# Patient Record
Sex: FEMALE | Race: WHITE | URBAN - METROPOLITAN AREA
[De-identification: names, ages, dates, MRNs, and addresses within clinical notes are randomized per-mention and may not be internally consistent; named-entity substitution may affect disease eponyms.]

---

## 2017-02-08 ENCOUNTER — OFFICE VISIT (OUTPATIENT)
Dept: FAMILY MEDICINE CLINIC | Facility: CLINIC | Age: 82
End: 2017-02-08

## 2017-02-08 ENCOUNTER — APPOINTMENT (OUTPATIENT)
Dept: LAB | Age: 82
End: 2017-02-08
Attending: FAMILY MEDICINE
Payer: MEDICARE

## 2017-02-08 ENCOUNTER — TELEPHONE (OUTPATIENT)
Dept: FAMILY MEDICINE CLINIC | Facility: CLINIC | Age: 82
End: 2017-02-08

## 2017-02-08 VITALS
SYSTOLIC BLOOD PRESSURE: 158 MMHG | HEART RATE: 68 BPM | DIASTOLIC BLOOD PRESSURE: 78 MMHG | BODY MASS INDEX: 35.96 KG/M2 | WEIGHT: 169 LBS | OXYGEN SATURATION: 96 % | TEMPERATURE: 96 F | RESPIRATION RATE: 36 BRPM | HEIGHT: 57.5 IN

## 2017-02-08 DIAGNOSIS — J44.1 OBSTRUCTIVE CHRONIC BRONCHITIS WITH EXACERBATION (HCC): ICD-10-CM

## 2017-02-08 DIAGNOSIS — E03.9 HYPOTHYROIDISM, UNSPECIFIED TYPE: ICD-10-CM

## 2017-02-08 DIAGNOSIS — I10 ESSENTIAL HYPERTENSION: ICD-10-CM

## 2017-02-08 DIAGNOSIS — I10 ESSENTIAL HYPERTENSION: Primary | ICD-10-CM

## 2017-02-08 DIAGNOSIS — M15.9 PRIMARY OSTEOARTHRITIS INVOLVING MULTIPLE JOINTS: ICD-10-CM

## 2017-02-08 DIAGNOSIS — J84.112 IDIOPATHIC FIBROSING ALVEOLITIS (HCC): ICD-10-CM

## 2017-02-08 LAB — TSI SER-ACNC: 3.49 MIU/ML (ref 0.35–5.5)

## 2017-02-08 PROCEDURE — 99214 OFFICE O/P EST MOD 30 MIN: CPT | Performed by: FAMILY MEDICINE

## 2017-02-08 PROCEDURE — 84443 ASSAY THYROID STIM HORMONE: CPT

## 2017-02-08 NOTE — PROGRESS NOTES
[address]  PROGRESS NOTE  Chief Complaint:   Patient presents with: Follow - Up: medical check      HPI:   This is a 80year old female coming in for HTN f/u. soes not check home BP    Breathing- stable- on oxygen 3 Liter.  Sees Dr. Adrienne Ramirez Cap  Disp:  Rfl:    celecoxib (CELEBREX) 200 MG Oral Cap Take 200 mg by mouth daily.    Disp:  Rfl:    clotrimazole-betamethasone (LOTRISONE) 1-0.05 % External Cream  Disp:  Rfl:    Fesoterodine Fumarate ER (TOVIAZ) 4 MG Oral Tablet 24 Hr Take 4 mg by mouth increased after lab work that she had done approximately 3-4 months ago. Patient due for follow-up TSH today  ALLERGIES:  Denies allergic response, history of asthma, sneezing, hives, eczema or rhinitis. EXAM:   /78 mmHg  Pulse 68  Temp(Src) 96. cuff      Meds & Refills for this Visit:  No prescriptions requested or ordered in this encounter    Health Maintenance:  Annual Depression Screen due on 05/16/1929  Annual Physical due on 05/16/1931  Mammogram,1 Yr due on 05/16/1969  FIT Colorectal Screen

## 2017-02-08 NOTE — PATIENT INSTRUCTIONS
rec continue meds  Continue use of oxygen  rec recheck tsh- dose of levothyroxine changed on November    Consider getting home BP cuff

## 2017-02-09 NOTE — TELEPHONE ENCOUNTER
----- Message from Rupa Rooney MD sent at 2/8/2017  6:26 PM CST -----  Normal tsh- continue same dose of synthroid.  Recheck 6-12 months with routine labs

## 2017-04-17 ENCOUNTER — OFFICE VISIT (OUTPATIENT)
Dept: FAMILY MEDICINE CLINIC | Facility: CLINIC | Age: 82
End: 2017-04-17

## 2017-04-17 VITALS
RESPIRATION RATE: 20 BRPM | BODY MASS INDEX: 36.43 KG/M2 | SYSTOLIC BLOOD PRESSURE: 142 MMHG | DIASTOLIC BLOOD PRESSURE: 84 MMHG | TEMPERATURE: 97 F | WEIGHT: 171.19 LBS | HEIGHT: 57.5 IN | HEART RATE: 64 BPM

## 2017-04-17 DIAGNOSIS — S89.92XA TRAUMATIC LEG INJURY, LEFT, INITIAL ENCOUNTER: Primary | ICD-10-CM

## 2017-04-17 PROCEDURE — 99213 OFFICE O/P EST LOW 20 MIN: CPT | Performed by: NURSE PRACTITIONER

## 2017-04-17 RX ORDER — LEVOTHYROXINE SODIUM 0.03 MG/1
25 TABLET ORAL
COMMUNITY
End: 2018-05-30 | Stop reason: ALTCHOICE

## 2017-04-18 NOTE — PATIENT INSTRUCTIONS
Monitor area. Massage area several times a day. Ice for today and then heat to the area. Return to clinic if symptoms worsen or not improving.

## 2017-04-18 NOTE — PROGRESS NOTES
HPI:    Patient ID: Nettie Galarza is a 80year old female. HPI     Taye Grimaldo in bathroom early Saturday morning. States she just tripped and fell. Denies any pain except to the right lower leg with pressure. No pain with walking.  Notes a small bruise below Right lower leg: She exhibits tenderness. Legs:  Ambulates with walker. +1 - 2 edema to the lower extremities bilaterally   Neurological: She is alert and oriented to person, place, and time.    Skin:        Psychiatric: Thought content normal.

## 2017-06-12 ENCOUNTER — OFFICE VISIT (OUTPATIENT)
Dept: FAMILY MEDICINE CLINIC | Facility: CLINIC | Age: 82
End: 2017-06-12

## 2017-06-12 VITALS
BODY MASS INDEX: 36 KG/M2 | TEMPERATURE: 98 F | RESPIRATION RATE: 18 BRPM | HEART RATE: 64 BPM | WEIGHT: 171.5 LBS | SYSTOLIC BLOOD PRESSURE: 142 MMHG | OXYGEN SATURATION: 92 % | DIASTOLIC BLOOD PRESSURE: 68 MMHG

## 2017-06-12 DIAGNOSIS — J84.112 IDIOPATHIC FIBROSING ALVEOLITIS (HCC): ICD-10-CM

## 2017-06-12 DIAGNOSIS — N18.30 STAGE 3 CHRONIC KIDNEY DISEASE (HCC): ICD-10-CM

## 2017-06-12 DIAGNOSIS — M54.9 OTHER CHRONIC BACK PAIN: ICD-10-CM

## 2017-06-12 DIAGNOSIS — I10 ESSENTIAL HYPERTENSION: Primary | ICD-10-CM

## 2017-06-12 DIAGNOSIS — J44.1 OBSTRUCTIVE CHRONIC BRONCHITIS WITH EXACERBATION (HCC): ICD-10-CM

## 2017-06-12 DIAGNOSIS — N39.3 FEMALE STRESS INCONTINENCE: ICD-10-CM

## 2017-06-12 DIAGNOSIS — E03.9 HYPOTHYROIDISM, UNSPECIFIED TYPE: ICD-10-CM

## 2017-06-12 DIAGNOSIS — G89.29 OTHER CHRONIC BACK PAIN: ICD-10-CM

## 2017-06-12 DIAGNOSIS — E78.49 FAMILIAL MULTIPLE LIPOPROTEIN-TYPE HYPERLIPIDEMIA: ICD-10-CM

## 2017-06-12 PROCEDURE — 99214 OFFICE O/P EST MOD 30 MIN: CPT | Performed by: FAMILY MEDICINE

## 2017-06-12 NOTE — PROGRESS NOTES
[address]  PROGRESS NOTE  Chief Complaint:   Patient presents with: Follow - Up      HPI:   This is a 80year old female coming in for   Pt at home with . On oxygen and using walker.   Pt with  Mild fall in April- none since  [facility] Comment:Other reaction(s): hives  Atorvastatin            Myalgia  Clarithromycin              Comment:Other reaction(s): weakness, numbness/tinglilng/rm  Doxycycline                 Comment:Other reaction(s): yeast infection  Current Me stool.  MUSCULOSKELETAL: see above  NEUROLOGICAL:  Denies headache, seizures, dizziness, syncope, paralysis, ataxia, numbness or tingling in the extremities  HEMATOLOGIC:  Denies anemia, bleeding or bruising.   LYMPHATICS:  Denies enlarged nodes  PSYCHIATRI hyperlipidemia  - Lipid Panel [E]; Future    3. Idiopathic fibrosing alveolitis ([facility] [address]  Stable patient follows up with Dr. Dorothy Hernandez intermittently    4. Obstructive chronic bronchitis with exacerbation ([facility] [address]  As above  5.  Hypothyroidism, unspecified type  Labs Osteoporosis     Chronic kidney disease     Idiopathic fibrosing alveolitis (HCC)     Transient cerebral ischemia     Benign paroxysmal positional vertigo     Back pain      Josue Richardson MD  6/12/2017  10:07 AM

## 2017-06-12 NOTE — PATIENT INSTRUCTIONS
Fasting labs - chem ,lipid, tsh, cbc    Cardiology- f.u Dr. Ramírez Spearing other meds    Medicare physical at next visit- 3-4 months pending review of labs

## 2017-06-22 ENCOUNTER — LAB ENCOUNTER (OUTPATIENT)
Dept: LAB | Age: 82
End: 2017-06-22
Attending: FAMILY MEDICINE
Payer: MEDICARE

## 2017-06-22 DIAGNOSIS — E78.49 FAMILIAL MULTIPLE LIPOPROTEIN-TYPE HYPERLIPIDEMIA: ICD-10-CM

## 2017-06-22 DIAGNOSIS — I10 ESSENTIAL HYPERTENSION: ICD-10-CM

## 2017-06-22 DIAGNOSIS — N18.30 STAGE 3 CHRONIC KIDNEY DISEASE (HCC): ICD-10-CM

## 2017-06-22 DIAGNOSIS — E03.9 HYPOTHYROIDISM, UNSPECIFIED TYPE: ICD-10-CM

## 2017-06-22 PROCEDURE — 85025 COMPLETE CBC W/AUTO DIFF WBC: CPT

## 2017-06-22 PROCEDURE — 84443 ASSAY THYROID STIM HORMONE: CPT

## 2017-06-22 PROCEDURE — 80053 COMPREHEN METABOLIC PANEL: CPT

## 2017-06-22 PROCEDURE — 36415 COLL VENOUS BLD VENIPUNCTURE: CPT

## 2017-06-22 PROCEDURE — 80061 LIPID PANEL: CPT

## 2017-06-23 ENCOUNTER — TELEPHONE (OUTPATIENT)
Dept: FAMILY MEDICINE CLINIC | Facility: CLINIC | Age: 82
End: 2017-06-23

## 2017-06-23 DIAGNOSIS — N28.9 RENAL INSUFFICIENCY: Primary | ICD-10-CM

## 2017-06-23 RX ORDER — MELOXICAM 15 MG/1
15 TABLET ORAL DAILY
Qty: 90 TABLET | Refills: 0 | Status: SHIPPED | OUTPATIENT
Start: 2017-06-23 | End: 2017-10-13

## 2017-06-23 NOTE — TELEPHONE ENCOUNTER
Patient states the price of her celebrex (generic) has increased $80 a month  Called pharm to verify that this is the price for generic  States she can no longer afford medication  Wants to know if there is something else Dr. Candido Bear can give her that wi

## 2017-06-23 NOTE — TELEPHONE ENCOUNTER
Patient notified of medication change and expressed understanding  States Will call with update on how she is doing on med    Yani Shen, 06/23/2017, 2:52 PM

## 2017-07-28 RX ORDER — CLOTRIMAZOLE AND BETAMETHASONE DIPROPIONATE 10; .64 MG/G; MG/G
1 CREAM TOPICAL AS NEEDED
Qty: 45 G | Refills: 0 | Status: SHIPPED | OUTPATIENT
Start: 2017-07-28 | End: 2020-02-10

## 2017-10-13 ENCOUNTER — OFFICE VISIT (OUTPATIENT)
Dept: FAMILY MEDICINE CLINIC | Facility: CLINIC | Age: 82
End: 2017-10-13

## 2017-10-13 VITALS
HEART RATE: 60 BPM | TEMPERATURE: 98 F | DIASTOLIC BLOOD PRESSURE: 82 MMHG | BODY MASS INDEX: 37.03 KG/M2 | RESPIRATION RATE: 18 BRPM | SYSTOLIC BLOOD PRESSURE: 158 MMHG | WEIGHT: 174 LBS | HEIGHT: 57.5 IN | OXYGEN SATURATION: 92 %

## 2017-10-13 DIAGNOSIS — I10 ESSENTIAL HYPERTENSION: ICD-10-CM

## 2017-10-13 DIAGNOSIS — N18.30 STAGE 3 CHRONIC KIDNEY DISEASE (HCC): ICD-10-CM

## 2017-10-13 DIAGNOSIS — R26.9 ABNORMALITY OF GAIT: ICD-10-CM

## 2017-10-13 DIAGNOSIS — E78.49 FAMILIAL MULTIPLE LIPOPROTEIN-TYPE HYPERLIPIDEMIA: ICD-10-CM

## 2017-10-13 DIAGNOSIS — M15.9 PRIMARY OSTEOARTHRITIS INVOLVING MULTIPLE JOINTS: ICD-10-CM

## 2017-10-13 DIAGNOSIS — M81.0 AGE-RELATED OSTEOPOROSIS WITHOUT CURRENT PATHOLOGICAL FRACTURE: ICD-10-CM

## 2017-10-13 DIAGNOSIS — Z13.31 DEPRESSION SCREENING: ICD-10-CM

## 2017-10-13 DIAGNOSIS — N39.3 FEMALE STRESS INCONTINENCE: ICD-10-CM

## 2017-10-13 DIAGNOSIS — Z00.00 ENCOUNTER FOR ANNUAL HEALTH EXAMINATION: Primary | ICD-10-CM

## 2017-10-13 DIAGNOSIS — J44.1 OBSTRUCTIVE CHRONIC BRONCHITIS WITH EXACERBATION (HCC): ICD-10-CM

## 2017-10-13 DIAGNOSIS — E03.9 HYPOTHYROIDISM, UNSPECIFIED TYPE: ICD-10-CM

## 2017-10-13 PROCEDURE — 90736 HZV VACCINE LIVE SUBQ: CPT | Performed by: FAMILY MEDICINE

## 2017-10-13 PROCEDURE — G0439 PPPS, SUBSEQ VISIT: HCPCS | Performed by: FAMILY MEDICINE

## 2017-10-13 PROCEDURE — 90472 IMMUNIZATION ADMIN EACH ADD: CPT | Performed by: FAMILY MEDICINE

## 2017-10-13 PROCEDURE — G0444 DEPRESSION SCREEN ANNUAL: HCPCS | Performed by: FAMILY MEDICINE

## 2017-10-13 PROCEDURE — G0008 ADMIN INFLUENZA VIRUS VAC: HCPCS | Performed by: FAMILY MEDICINE

## 2017-10-13 PROCEDURE — 90653 IIV ADJUVANT VACCINE IM: CPT | Performed by: FAMILY MEDICINE

## 2017-10-13 RX ORDER — MELOXICAM 15 MG/1
15 TABLET ORAL DAILY
Qty: 90 TABLET | Refills: 1 | Status: SHIPPED | OUTPATIENT
Start: 2017-10-13 | End: 2018-12-20

## 2017-10-13 NOTE — PATIENT INSTRUCTIONS
rec fasting labs    rec Sr.  Flu vaccine    rec Shingle vaccine    Continue meds    Continue oixygen    Pt to return after has labs  To review results and remove lesion on forehead    Chapis Benson's SCREENING SCHEDULE   Tests on this list are recommended than 100 cigarettes in their lifetime   • Anyone with a family history    Colorectal Cancer Screening  Covered up to Age 76     Colonoscopy Screen   Covered every 10 years- more often if abnormal There are no preventive care reminders to display for this p or any previous visit. Please get every year    Pneumococcal 13 (Prevnar)  Covered Once after 65 No orders found for this or any previous visit.  Please get once after your 65th birthday    Pneumococcal 23 (Pneumovax)  Covered Once after 65 No orders found

## 2017-10-13 NOTE — PROGRESS NOTES
CC: Annual Physical Exam    HPI:   Priya King is a 80year old female who presents for a complete physical exam.     Pt with help of 80 yr     Pt generally feeling ok. On chronic oxygen  Use of walker.  Last fall- tripped 3-4 months ago- no injur Eosinophil Absolute 0.35 (H) 0.00 - 0.30 x10(3) uL   Basophil Absolute 0.06 0.00 - 0.10 x10(3) uL   Immature Granulocyte Absolute 0.03 0.00 - 1.00 x10(3) uL   Neutrophil % 55.4 %   Lymphocyte % 29.0 %   Monocyte % 9.8 %   Eosinophil % 4.6 %   Basophil % Social History:   Social History    Marital status:              Spouse name:                       Years of education:                 Number of children:               Social History Main Topics    Smoking status: Never Smoker 37.00 kg/m²  Estimated body mass index is 37 kg/m² as calculated from the following:    Height as of this encounter: 57.5\". Weight as of this encounter: 174 lb. Vital signs reviewed. Appears stated age, well groomed.   Physical Exam:  GEN:  Patient is Future    3. Essential hypertension    - CBC WITH DIFFERENTIAL WITH PLATELET; Future  - COMP METABOLIC PANEL (14); Future  - LIPID PANEL; Future  - URINALYSIS WITH CULTURE REFLEX; Future  - TSH W REFLEX TO FREE T4; Future    4.  Obstructive chronic bronchit DIFFERENTIAL WITH PLATELET; Future  - COMP METABOLIC PANEL (14); Future  - LIPID PANEL; Future  - URINALYSIS WITH CULTURE REFLEX; Future  - TSH W REFLEX TO FREE T4; Future      Order for mammogram and dexascan  Self Breast exam reviewed.   [facility] impaired FBS or GTT   … or any two of the following:   • Overweight (BMI ³25 but <30)   • Family history of diabetes   • Age 72 years or older   • History of gestational diabetes or birth of baby weighing more than 9 pounds     Covered at least every 3 yea to schedule if you are in this risk group, make sure you have a referral   Bone Density Screening      Bone density screening   Covered every 2 yrs after age 72    Covered yearly for Long term Glucocorticoid medication (Steroids) Requires diagnosis related this or any previous visit. This may be covered with your prescription benefits, but Medicare does not cover unless Medically needed    Zoster (Not covered by Medicare Part B) No orders found for this or any previous visit.  This may be covered with your ph

## 2017-10-23 ENCOUNTER — TELEPHONE (OUTPATIENT)
Dept: FAMILY MEDICINE CLINIC | Facility: CLINIC | Age: 82
End: 2017-10-23

## 2017-10-23 ENCOUNTER — LABORATORY ENCOUNTER (OUTPATIENT)
Dept: LAB | Age: 82
End: 2017-10-23
Attending: FAMILY MEDICINE
Payer: MEDICARE

## 2017-10-23 DIAGNOSIS — N39.3 FEMALE STRESS INCONTINENCE: ICD-10-CM

## 2017-10-23 DIAGNOSIS — R26.9 ABNORMALITY OF GAIT: ICD-10-CM

## 2017-10-23 DIAGNOSIS — J44.1 OBSTRUCTIVE CHRONIC BRONCHITIS WITH EXACERBATION (HCC): ICD-10-CM

## 2017-10-23 DIAGNOSIS — E78.49 FAMILIAL MULTIPLE LIPOPROTEIN-TYPE HYPERLIPIDEMIA: ICD-10-CM

## 2017-10-23 DIAGNOSIS — N18.30 STAGE 3 CHRONIC KIDNEY DISEASE (HCC): ICD-10-CM

## 2017-10-23 DIAGNOSIS — Z00.00 ENCOUNTER FOR ANNUAL HEALTH EXAMINATION: ICD-10-CM

## 2017-10-23 DIAGNOSIS — I10 ESSENTIAL HYPERTENSION: ICD-10-CM

## 2017-10-23 DIAGNOSIS — Z13.31 DEPRESSION SCREENING: ICD-10-CM

## 2017-10-23 DIAGNOSIS — E03.9 HYPOTHYROIDISM, UNSPECIFIED TYPE: ICD-10-CM

## 2017-10-23 DIAGNOSIS — M81.0 AGE-RELATED OSTEOPOROSIS WITHOUT CURRENT PATHOLOGICAL FRACTURE: ICD-10-CM

## 2017-10-23 DIAGNOSIS — M15.9 PRIMARY OSTEOARTHRITIS INVOLVING MULTIPLE JOINTS: ICD-10-CM

## 2017-10-23 DIAGNOSIS — N28.9 RENAL INSUFFICIENCY: ICD-10-CM

## 2017-10-23 PROCEDURE — 36415 COLL VENOUS BLD VENIPUNCTURE: CPT

## 2017-10-23 PROCEDURE — 80061 LIPID PANEL: CPT

## 2017-10-23 PROCEDURE — 80053 COMPREHEN METABOLIC PANEL: CPT

## 2017-10-23 PROCEDURE — 85025 COMPLETE CBC W/AUTO DIFF WBC: CPT

## 2017-10-23 PROCEDURE — 82306 VITAMIN D 25 HYDROXY: CPT

## 2017-10-23 PROCEDURE — 84443 ASSAY THYROID STIM HORMONE: CPT

## 2017-10-23 NOTE — TELEPHONE ENCOUNTER
Patient states growth on forehead dried up and fell off. Do you still want patient to followup? Please advise

## 2017-10-31 ENCOUNTER — TELEPHONE (OUTPATIENT)
Dept: FAMILY MEDICINE CLINIC | Facility: CLINIC | Age: 82
End: 2017-10-31

## 2017-10-31 DIAGNOSIS — E55.9 VITAMIN D DEFICIENCY: Primary | ICD-10-CM

## 2017-10-31 RX ORDER — ERGOCALCIFEROL 1.25 MG/1
50000 CAPSULE ORAL WEEKLY
Qty: 12 CAPSULE | Refills: 0 | Status: SHIPPED | OUTPATIENT
Start: 2017-10-31 | End: 2018-01-17

## 2017-10-31 NOTE — TELEPHONE ENCOUNTER
----- Message from Roland Sharma MD sent at 10/31/2017  9:45 AM CDT -----  Laboratory results reviewed. Patient's renal function slightly decreased but stable. Patient's chemistry panel is otherwise normal her lipids are in the desired range.   Samuel

## 2017-11-20 RX ORDER — LEVOTHYROXINE SODIUM 0.07 MG/1
TABLET ORAL
Qty: 90 TABLET | Refills: 2 | Status: SHIPPED | OUTPATIENT
Start: 2017-11-20 | End: 2019-02-20

## 2017-11-20 NOTE — TELEPHONE ENCOUNTER
Future appt: Patient does not have an upcoming appt scheduled.  Patient was instructed to return after labs    Last Appointment:  10/13/2017    Medication last refilled:  11/7/16    Cholesterol, Total (mg/dL)   Date Value   10/23/2017 185   ----------  HDL

## 2017-12-04 NOTE — TELEPHONE ENCOUNTER
Future appt:    Last Appointment:  10/13/2017 Dr. Christophe Ray for physical    Cholesterol, Total (mg/dL)   Date Value   10/23/2017 185   ----------  HDL Cholesterol (mg/dL)   Date Value   10/23/2017 88   ----------  LDL Cholesterol (mg/dL)   Date Value   10/

## 2017-12-05 RX ORDER — FESOTERODINE FUMARATE 4 MG/1
TABLET, FILM COATED, EXTENDED RELEASE ORAL
Qty: 90 TABLET | Refills: 2 | Status: SHIPPED | OUTPATIENT
Start: 2017-12-05 | End: 2018-12-20

## 2017-12-05 RX ORDER — FUROSEMIDE 20 MG/1
TABLET ORAL
Qty: 90 TABLET | Refills: 0 | Status: SHIPPED | OUTPATIENT
Start: 2017-12-05 | End: 2018-08-06

## 2018-04-09 ENCOUNTER — APPOINTMENT (OUTPATIENT)
Dept: LAB | Age: 83
End: 2018-04-09
Attending: FAMILY MEDICINE
Payer: MEDICARE

## 2018-04-09 DIAGNOSIS — E55.9 VITAMIN D DEFICIENCY: ICD-10-CM

## 2018-04-09 PROCEDURE — 36415 COLL VENOUS BLD VENIPUNCTURE: CPT

## 2018-04-09 PROCEDURE — 82306 VITAMIN D 25 HYDROXY: CPT

## 2018-04-10 ENCOUNTER — TELEPHONE (OUTPATIENT)
Dept: FAMILY MEDICINE CLINIC | Facility: CLINIC | Age: 83
End: 2018-04-10

## 2018-04-10 DIAGNOSIS — E55.9 VITAMIN D DEFICIENCY: Primary | ICD-10-CM

## 2018-04-10 RX ORDER — ERGOCALCIFEROL 1.25 MG/1
50000 CAPSULE ORAL WEEKLY
Qty: 12 CAPSULE | Refills: 0 | Status: SHIPPED | OUTPATIENT
Start: 2018-04-10 | End: 2018-06-27

## 2018-04-10 NOTE — TELEPHONE ENCOUNTER
----- Message from Anup Sy MD sent at 4/10/2018  7:58 AM CDT -----  Vit d low but improved, I rec continued vit D weekly suppliment. Recheck level 3 months.  Rx sent to pharmacy on file

## 2018-05-13 DIAGNOSIS — E03.9 HYPOTHYROIDISM, UNSPECIFIED TYPE: ICD-10-CM

## 2018-05-13 DIAGNOSIS — N28.9 RENAL INSUFFICIENCY: Primary | ICD-10-CM

## 2018-05-13 DIAGNOSIS — E78.2 MIXED HYPERLIPIDEMIA: ICD-10-CM

## 2018-05-13 DIAGNOSIS — E55.9 VITAMIN D DEFICIENCY: ICD-10-CM

## 2018-05-14 NOTE — TELEPHONE ENCOUNTER
Future appt:    Last Appointment:  10/13/17 with Dr. Soren Ramirez for annual physical and other health issues  No indication of return date  This med is not on patient's active med list  Med was d/c in [facility] [address] on 6/13/17 for a reason of cost      Cholesterol, To

## 2018-05-15 RX ORDER — CELECOXIB 200 MG/1
CAPSULE ORAL
Qty: 90 CAPSULE | Refills: 0 | OUTPATIENT
Start: 2018-05-15

## 2018-05-15 NOTE — TELEPHONE ENCOUNTER
Pt states she is taking both Celebrex 200 mg daily and Mobic 15 mg daily.   - pt states both are being prescribed per PCP    [address] last dispensed back on 10/13/17 for #90- no refills  Celebrex last dispensed back on 10/17/17 #90- no refill

## 2018-05-16 NOTE — TELEPHONE ENCOUNTER
Pt informed, appt's scheduled.     Future Appointments  Date Time Provider Vicente Diane   5/19/2018 9:45 AM REF [place] [facility]   5/30/2018 3:30 PM Dana Armstrong MD [facility] [place] [facility] [place]   6/1/2018 2:00 PM Dana Armstrong MD

## 2018-05-19 ENCOUNTER — LABORATORY ENCOUNTER (OUTPATIENT)
Dept: LAB | Age: 83
End: 2018-05-19
Attending: FAMILY MEDICINE
Payer: MEDICARE

## 2018-05-19 DIAGNOSIS — E78.2 MIXED HYPERLIPIDEMIA: ICD-10-CM

## 2018-05-19 DIAGNOSIS — N28.9 RENAL INSUFFICIENCY: ICD-10-CM

## 2018-05-19 DIAGNOSIS — E55.9 VITAMIN D DEFICIENCY: ICD-10-CM

## 2018-05-19 DIAGNOSIS — E03.9 HYPOTHYROIDISM, UNSPECIFIED TYPE: ICD-10-CM

## 2018-05-19 PROCEDURE — 36415 COLL VENOUS BLD VENIPUNCTURE: CPT

## 2018-05-19 PROCEDURE — 84550 ASSAY OF BLOOD/URIC ACID: CPT

## 2018-05-19 PROCEDURE — 84439 ASSAY OF FREE THYROXINE: CPT

## 2018-05-19 PROCEDURE — 82306 VITAMIN D 25 HYDROXY: CPT

## 2018-05-19 PROCEDURE — 84443 ASSAY THYROID STIM HORMONE: CPT

## 2018-05-19 PROCEDURE — 85025 COMPLETE CBC W/AUTO DIFF WBC: CPT

## 2018-05-19 PROCEDURE — 83735 ASSAY OF MAGNESIUM: CPT

## 2018-05-19 PROCEDURE — 80053 COMPREHEN METABOLIC PANEL: CPT

## 2018-05-19 PROCEDURE — 80061 LIPID PANEL: CPT

## 2018-05-19 PROCEDURE — 81003 URINALYSIS AUTO W/O SCOPE: CPT

## 2018-05-23 ENCOUNTER — TELEPHONE (OUTPATIENT)
Dept: FAMILY MEDICINE CLINIC | Facility: CLINIC | Age: 83
End: 2018-05-23

## 2018-05-23 NOTE — TELEPHONE ENCOUNTER
----- Message from Capri Hart MD sent at 5/23/2018  1:17 PM CDT -----  5/30/2018  3:30 PM    Duglas Bardales MD     [facility] [place]   [facility] [place]  6/1/2018   2:00 PM    Duglas Bardales MD     [facility] [place]   [facility] reviewed, sta

## 2018-05-30 ENCOUNTER — OFFICE VISIT (OUTPATIENT)
Dept: FAMILY MEDICINE CLINIC | Facility: CLINIC | Age: 83
End: 2018-05-30

## 2018-05-30 VITALS
HEIGHT: 57.5 IN | WEIGHT: 168 LBS | SYSTOLIC BLOOD PRESSURE: 154 MMHG | HEART RATE: 70 BPM | BODY MASS INDEX: 35.75 KG/M2 | OXYGEN SATURATION: 91 % | RESPIRATION RATE: 18 BRPM | TEMPERATURE: 97 F | DIASTOLIC BLOOD PRESSURE: 72 MMHG

## 2018-05-30 DIAGNOSIS — M15.9 PRIMARY OSTEOARTHRITIS INVOLVING MULTIPLE JOINTS: ICD-10-CM

## 2018-05-30 DIAGNOSIS — I10 ESSENTIAL HYPERTENSION: Primary | ICD-10-CM

## 2018-05-30 DIAGNOSIS — E03.9 HYPOTHYROIDISM, UNSPECIFIED TYPE: ICD-10-CM

## 2018-05-30 DIAGNOSIS — N18.30 STAGE 3 CHRONIC KIDNEY DISEASE (HCC): ICD-10-CM

## 2018-05-30 DIAGNOSIS — J44.1 OBSTRUCTIVE CHRONIC BRONCHITIS WITH EXACERBATION (HCC): ICD-10-CM

## 2018-05-30 DIAGNOSIS — E78.49 FAMILIAL MULTIPLE LIPOPROTEIN-TYPE HYPERLIPIDEMIA: ICD-10-CM

## 2018-05-30 PROCEDURE — 99214 OFFICE O/P EST MOD 30 MIN: CPT | Performed by: FAMILY MEDICINE

## 2018-05-30 RX ORDER — POTASSIUM CHLORIDE 20 MEQ/1
20 TABLET, EXTENDED RELEASE ORAL 2 TIMES DAILY
Qty: 180 TABLET | Refills: 0 | Status: SHIPPED | OUTPATIENT
Start: 2018-05-30 | End: 2019-03-22

## 2018-05-30 NOTE — PROGRESS NOTES
[facility] [place]  PROGRESS NOTE  Chief Complaint:   Patient presents with:  Medication Follow-Up      HPI:   This is a 80year old female coming in for medical f.u    Pt doing ok, some fatigue- busy with  turning 80.    Breathing ok-- ox Urine Color Yellow Yellow   Clarity Urine Clear Clear   Spec Gravity 1.015 1.001 - 1.030   Glucose Urine Negative Negative mg/dl   Bilirubin Urine Negative Negative   Ketones Urine Negative Negative mg/dL   Blood Urine Negative Negative   pH Urine 6.0 4. History    Marital status:              Spouse name:                       Years of education:                 Number of children:               Social History Main Topics    Smoking status: Never Smoker tablet by mouth as needed. Disp:  Rfl:    simvastatin 10 MG Oral Tab Take 10 mg by mouth daily. Disp:  Rfl:       Counseling given: Not Answered       REVIEW OF SYSTEMS:   CONSTITUTIONAL:  Denies unusual weight gain/loss, fever, chills, or fatigue.   EE PERRLA, no scleral icterus, conjunctivae clear bilaterally, no eye discharge Ears: External normal. Nose: patent, no nasal discharge Throat:  No tonsillar erythema or exudate. Mouth:  No oral lesions or ulcerations, good dentition.   NECK: Supple, no thyro then          Meds & Refills for this Visit:  Signed Prescriptions Disp Refills    Potassium Chloride ER (KLOR-CON M20) 20 MEQ Oral Tab  tablet 0      Sig: Take 1 tablet (20 mEq total) by mouth 2 (two) times daily.            Health Maintenance:

## 2018-05-30 NOTE — PATIENT INSTRUCTIONS
Pt-- to have med replaced that spilled-- Potassium Chloride  New Rx sent and discussed with [facility]    Continue medications-- recheck-- After October 13-- need fasting labs and flu shot then

## 2018-06-05 ENCOUNTER — TELEPHONE (OUTPATIENT)
Dept: FAMILY MEDICINE CLINIC | Facility: CLINIC | Age: 83
End: 2018-06-05

## 2018-06-05 DIAGNOSIS — Z00.00 ANNUAL PHYSICAL EXAM: ICD-10-CM

## 2018-06-05 DIAGNOSIS — E78.5 HYPERLIPIDEMIA, UNSPECIFIED HYPERLIPIDEMIA TYPE: ICD-10-CM

## 2018-06-05 DIAGNOSIS — E03.9 HYPOTHYROIDISM, UNSPECIFIED TYPE: Primary | ICD-10-CM

## 2018-06-05 NOTE — TELEPHONE ENCOUNTER
Your appointments     Date & Time Appointment Department [facility])    Oct 12, 2018  9:15 AM CDT Laboratory Visit with [facility] ([facility])    Oct 16, 2018 10:00 AM CDT Medicare Annual Well Visit with Katrin Martinez

## 2018-08-06 RX ORDER — FUROSEMIDE 20 MG/1
TABLET ORAL
Qty: 90 TABLET | Refills: 0 | Status: SHIPPED | OUTPATIENT
Start: 2018-08-06 | End: 2019-02-20

## 2018-08-06 NOTE — TELEPHONE ENCOUNTER
Please advise refill of furosemide. Future appt:     Your appointments     Date & Time Appointment Department [facility])    Oct 12, 2018  9:15 AM CDT Laboratory Visit with REF Danelle [facility] ([facility])    Oct 16, 2018 10:00 AM C

## 2018-09-18 ENCOUNTER — TELEPHONE (OUTPATIENT)
Dept: FAMILY MEDICINE CLINIC | Facility: CLINIC | Age: 83
End: 2018-09-18

## 2018-09-18 NOTE — TELEPHONE ENCOUNTER
Pt c/o productive cough for last 3 days- pt states cough can also be barky. Pt has pale yellow mucous. C/o mild congestion- occassional runny nose. No fever reported. Overall pt feels okay. Pt wears 3 liters of continuous oxygen.    Pt has not tried a

## 2018-10-15 RX ORDER — SIMVASTATIN 10 MG
TABLET ORAL
Qty: 90 TABLET | Refills: 2 | Status: SHIPPED | OUTPATIENT
Start: 2018-10-15 | End: 2019-01-07

## 2018-10-15 NOTE — TELEPHONE ENCOUNTER
Future appt:     Your appointments     Date & Time Appointment Department [facility])    Oct 16, 2018 10:00 AM CDT Medicare Annual Well Visit with Duglas Bardales MD [address] [place] [address], [address]

## 2018-10-15 NOTE — TELEPHONE ENCOUNTER
Called [place] to f/u - last refill Simvastatin was refilled back in 2016. Pt states she believes she is taking?     Pt has appt Tuesday    Future Appointments   Date Time Provider Vicente Diane   10/16/2018 10:00 AM [facility]

## 2018-10-16 ENCOUNTER — OFFICE VISIT (OUTPATIENT)
Dept: FAMILY MEDICINE CLINIC | Facility: CLINIC | Age: 83
End: 2018-10-16
Payer: MEDICARE

## 2018-10-16 VITALS
BODY MASS INDEX: 36.67 KG/M2 | WEIGHT: 163 LBS | SYSTOLIC BLOOD PRESSURE: 136 MMHG | TEMPERATURE: 98 F | HEART RATE: 64 BPM | RESPIRATION RATE: 20 BRPM | HEIGHT: 56 IN | OXYGEN SATURATION: 90 % | DIASTOLIC BLOOD PRESSURE: 78 MMHG

## 2018-10-16 DIAGNOSIS — Z23 NEED FOR VACCINATION: ICD-10-CM

## 2018-10-16 DIAGNOSIS — E78.49 FAMILIAL MULTIPLE LIPOPROTEIN-TYPE HYPERLIPIDEMIA: Primary | ICD-10-CM

## 2018-10-16 DIAGNOSIS — Z13.31 DEPRESSION SCREENING: ICD-10-CM

## 2018-10-16 DIAGNOSIS — E03.9 HYPOTHYROIDISM, UNSPECIFIED TYPE: ICD-10-CM

## 2018-10-16 DIAGNOSIS — G45.9 TRANSIENT CEREBRAL ISCHEMIA, UNSPECIFIED TYPE: ICD-10-CM

## 2018-10-16 DIAGNOSIS — M15.9 PRIMARY OSTEOARTHRITIS INVOLVING MULTIPLE JOINTS: ICD-10-CM

## 2018-10-16 DIAGNOSIS — I10 ESSENTIAL HYPERTENSION: ICD-10-CM

## 2018-10-16 DIAGNOSIS — J84.112 IDIOPATHIC FIBROSING ALVEOLITIS (HCC): ICD-10-CM

## 2018-10-16 DIAGNOSIS — Z23 NEED FOR INFLUENZA VACCINATION: ICD-10-CM

## 2018-10-16 DIAGNOSIS — Z00.00 ENCOUNTER FOR ANNUAL HEALTH EXAMINATION: ICD-10-CM

## 2018-10-16 DIAGNOSIS — N18.30 STAGE 3 CHRONIC KIDNEY DISEASE (HCC): ICD-10-CM

## 2018-10-16 DIAGNOSIS — J44.1 OBSTRUCTIVE CHRONIC BRONCHITIS WITH EXACERBATION (HCC): ICD-10-CM

## 2018-10-16 PROCEDURE — G0008 ADMIN INFLUENZA VIRUS VAC: HCPCS | Performed by: FAMILY MEDICINE

## 2018-10-16 PROCEDURE — 90653 IIV ADJUVANT VACCINE IM: CPT | Performed by: FAMILY MEDICINE

## 2018-10-16 PROCEDURE — G0439 PPPS, SUBSEQ VISIT: HCPCS | Performed by: FAMILY MEDICINE

## 2018-10-16 PROCEDURE — G0444 DEPRESSION SCREEN ANNUAL: HCPCS | Performed by: FAMILY MEDICINE

## 2018-10-16 NOTE — PROGRESS NOTES
CC: Annual Physical Exam    HPI:   Jorge Moran is a 80year old female who presents for a complete physical exam.   76 yr  No problem reported.   No fals  Using walker    Pt in Chronic oxygen -- for 8 years ( 2010)  Pteating less  Weight down 11b Negative Negative    Ketones Urine Negative Negative mg/dL    Blood Urine Negative Negative    pH Urine 6.0 4.5 - 8.0    Protein Urine Negative Negative mg/dl    Urobilinogen Urine 2.0 0.2 - 2.0 mg/dL    Nitrite Urine Negative Negative    Leukocyte Melina Hayes such as Influenza, Hepatitis B, Tetanus, or Pneumococcal?: No     Functional Ability     Bathing or Showering: Able without help    Toileting: Able without help    Dressing: Able without help    Eating: Able without help    Driving: Cannot do without help Chloride ER (KLOR-CON M20) 20 MEQ Oral Tab CR Take 1 tablet (20 mEq total) by mouth 2 (two) times daily.  Disp: 180 tablet Rfl: 0   TOVIAZ 4 MG Oral Tablet 24 Hr TAKE ONE TABLET BY MOUTH EVERY DAY Disp: 90 tablet Rfl: 2   LEVOTHYROXINE SODIUM 75 MCG Oral Ta Food insecurity - inability: Not on file      Transportation needs - medical: Not on file      Transportation needs - non-medical: Not on file    Occupational History      Not on file    Tobacco Use      Smoking status: Never Smoker      Smokeless tobacco history of asthma, sneezing, hives, eczema or rhinitis.     EXAM:   /78 (BP Location: Left arm, Patient Position: Sitting, Cuff Size: adult)   Pulse 64   Temp 97.5 °F (36.4 °C) (Temporal)   Resp 20   Ht 56\"   Wt 163 lb   SpO2 90%   BMI 36.54 kg/m²  E and she is on chronic nasal cannula oxygen therapy    2. Depression screening  Reviewed negative  - DEPRESSION SCREEN ANNUAL    3. Need for vaccination  Flu shot given today  - FLU VACCINE ADJUVANT IM    4.  Familial multiple lipoprotein-type hyperlipidemia return for fasting  Labs    appt with Dr. Radha Young. Continue present medications      Chapis Benson's SCREENING SCHEDULE   Tests on this list are recommended by your physician but may not be covered, or covered at this frequency, by your insurer.  Please c up to Age 76     Colonoscopy Screen   Covered every 10 years- more often if abnormal There are no preventive care reminders to display for this patient.  Update Health Maintenance if applicable    Flex Sigmoidoscopy Screen  Covered every 5 years No results found for this or any previous visit. Please get once after your 65th birthday    Pneumococcal 23 (Pneumovax)  Covered Once after 65 No orders found for this or any previous visit.  Please get once after your 65th birthday    Hepatitis B for Moderate/High R kg/m²., recommended low fat diet and aerobic exercise 30 minutes three times weekly. The patient indicates understanding of these issues and agrees to the plan.         Meds & Refills for this Visit:  Requested Prescriptions      No prescriptions requested

## 2018-10-17 ENCOUNTER — APPOINTMENT (OUTPATIENT)
Dept: LAB | Age: 83
End: 2018-10-17
Attending: FAMILY MEDICINE
Payer: MEDICARE

## 2018-10-17 PROCEDURE — 81003 URINALYSIS AUTO W/O SCOPE: CPT | Performed by: FAMILY MEDICINE

## 2018-10-19 ENCOUNTER — TELEPHONE (OUTPATIENT)
Dept: FAMILY MEDICINE CLINIC | Facility: CLINIC | Age: 83
End: 2018-10-19

## 2018-12-13 ENCOUNTER — LABORATORY ENCOUNTER (OUTPATIENT)
Dept: LAB | Age: 83
End: 2018-12-13
Attending: FAMILY MEDICINE
Payer: MEDICARE

## 2018-12-13 DIAGNOSIS — E78.5 HYPERLIPIDEMIA, UNSPECIFIED HYPERLIPIDEMIA TYPE: ICD-10-CM

## 2018-12-13 DIAGNOSIS — Z00.00 ANNUAL PHYSICAL EXAM: ICD-10-CM

## 2018-12-13 DIAGNOSIS — E03.9 HYPOTHYROIDISM, UNSPECIFIED TYPE: ICD-10-CM

## 2018-12-13 PROCEDURE — 80061 LIPID PANEL: CPT

## 2018-12-13 PROCEDURE — 85025 COMPLETE CBC W/AUTO DIFF WBC: CPT

## 2018-12-13 PROCEDURE — 84443 ASSAY THYROID STIM HORMONE: CPT

## 2018-12-13 PROCEDURE — 80053 COMPREHEN METABOLIC PANEL: CPT

## 2018-12-13 PROCEDURE — 36415 COLL VENOUS BLD VENIPUNCTURE: CPT

## 2018-12-13 PROCEDURE — 82306 VITAMIN D 25 HYDROXY: CPT

## 2018-12-14 ENCOUNTER — TELEPHONE (OUTPATIENT)
Dept: FAMILY MEDICINE CLINIC | Facility: CLINIC | Age: 83
End: 2018-12-14

## 2018-12-14 RX ORDER — MULTIVIT-MIN/IRON/FOLIC ACID/K 18-600-40
2000 CAPSULE ORAL DAILY
COMMUNITY
End: 2018-12-17

## 2018-12-14 NOTE — TELEPHONE ENCOUNTER
----- Message from Terry Medina MD sent at 12/14/2018  7:50 AM CST -----  Vitamin D level low. Patient should be taking at least 2000 units of vitamin D daily. Patient recommended to have an appointment to review all of her laboratory studies.

## 2018-12-14 NOTE — TELEPHONE ENCOUNTER
Pt informed. Appt scheduled for lab review as per MD recommendation.     Future Appointments   Date Time Provider Vicente Roxi   12/17/2018  3:30 PM Bethanie Corley MD [facility] [place] [facility]

## 2018-12-14 NOTE — TELEPHONE ENCOUNTER
----- Message from Eduardo Luong MD sent at 12/14/2018  7:49 AM CST -----  Abnormal laboratory studies with significant decline in patient renal function. I would recommend an appointment to discuss her labs and check on patient overall status.

## 2018-12-14 NOTE — TELEPHONE ENCOUNTER
Pt informed, appt scheduled.     Future Appointments   Date Time Provider Vicente Diane   12/17/2018  3:30 PM Jennifer Jarvis MD [facility] [place] [facility]

## 2018-12-17 ENCOUNTER — OFFICE VISIT (OUTPATIENT)
Dept: FAMILY MEDICINE CLINIC | Facility: CLINIC | Age: 83
End: 2018-12-17
Payer: MEDICARE

## 2018-12-17 VITALS
SYSTOLIC BLOOD PRESSURE: 154 MMHG | HEART RATE: 56 BPM | OXYGEN SATURATION: 90 % | DIASTOLIC BLOOD PRESSURE: 78 MMHG | RESPIRATION RATE: 26 BRPM | TEMPERATURE: 97 F | HEIGHT: 57.5 IN | WEIGHT: 152.38 LBS | BODY MASS INDEX: 32.43 KG/M2

## 2018-12-17 DIAGNOSIS — J44.1 OBSTRUCTIVE CHRONIC BRONCHITIS WITH EXACERBATION (HCC): ICD-10-CM

## 2018-12-17 DIAGNOSIS — N18.30 STAGE 3 CHRONIC KIDNEY DISEASE (HCC): ICD-10-CM

## 2018-12-17 DIAGNOSIS — E78.49 FAMILIAL MULTIPLE LIPOPROTEIN-TYPE HYPERLIPIDEMIA: ICD-10-CM

## 2018-12-17 DIAGNOSIS — E03.8 HYPOTHYROIDISM DUE TO HASHIMOTO'S THYROIDITIS: ICD-10-CM

## 2018-12-17 DIAGNOSIS — M15.9 PRIMARY OSTEOARTHRITIS INVOLVING MULTIPLE JOINTS: ICD-10-CM

## 2018-12-17 DIAGNOSIS — E06.3 HYPOTHYROIDISM DUE TO HASHIMOTO'S THYROIDITIS: ICD-10-CM

## 2018-12-17 DIAGNOSIS — I10 ESSENTIAL HYPERTENSION: Primary | ICD-10-CM

## 2018-12-17 DIAGNOSIS — R63.4 WEIGHT LOSS: ICD-10-CM

## 2018-12-17 DIAGNOSIS — J84.112 IDIOPATHIC FIBROSING ALVEOLITIS (HCC): ICD-10-CM

## 2018-12-17 PROCEDURE — 80048 BASIC METABOLIC PNL TOTAL CA: CPT | Performed by: FAMILY MEDICINE

## 2018-12-17 PROCEDURE — 99214 OFFICE O/P EST MOD 30 MIN: CPT | Performed by: FAMILY MEDICINE

## 2018-12-17 NOTE — PATIENT INSTRUCTIONS
Encourage hydration-- 6 glasses of water    Encourage small meals at least 3 x a day. Diet reviewed with patient and  we discussed healthy snacks.     Recheck renal function today    Recheck in Jan-- 4-6 weeks    rec vit D 2000 IU a day    Mahnaz MISHRA

## 2018-12-18 ENCOUNTER — TELEPHONE (OUTPATIENT)
Dept: FAMILY MEDICINE CLINIC | Facility: CLINIC | Age: 83
End: 2018-12-18

## 2018-12-18 DIAGNOSIS — N28.9 RENAL INSUFFICIENCY: Primary | ICD-10-CM

## 2018-12-18 NOTE — PROGRESS NOTES
[address]  PROGRESS NOTE  Chief Complaint:   Patient presents with: Follow - Up: Review lab results      HPI:   This is a 80year old female coming in for review of recent laboratory studies.   Patient presents with her  of 76 ye American 39 (L) >=60    GFR, -American 45 (L) >=60    AST 22 15 - 41 U/L    Alt 15 14 - 54 U/L    Alkaline Phosphatase 90 55 - 142 U/L    Bilirubin, Total 0.8 0.1 - 2.0 mg/dL    Total Protein 7.5 6.4 - 8.2 g/dL    Albumin 3.5 3.1 - 4.5 g/dL    Globu • COLONOSCOPY       Social History:  Social History    Socioeconomic History      Marital status:       Spouse name: Not on file      Number of children: Not on file      Years of education: Not on file      Highest education level: Not on file mg by mouth daily. Disp:  Rfl:    Calcium Carbonate (CALCIUM 600) 1500 (600 Ca) MG Oral Tab Take 2 tablets by mouth daily. Disp:  Rfl:    Loratadine (CLARITIN) 10 MG Oral Cap Take 1 tablet by mouth as needed.    Disp:  Rfl:       Counseling given: Not A close appear to be too large for her  Physical Exam:  GEN:  Patient is alert, awake and oriented, well developed, well nourished  , no apparent distress.   HEENT:  Head:  Normocephalic, atraumatic Eyes: EOMI, PERRLA, no scleral icterus, conjunctivae clear b Osteoarthritis     Osteoporosis     Chronic kidney disease     Idiopathic fibrosing alveolitis (HCC)     Transient cerebral ischemia     Benign paroxysmal positional vertigo     Back pain      Patient Instructions   Encourage hydration-- 6 glasses of water

## 2018-12-18 NOTE — TELEPHONE ENCOUNTER
----- Message from Krista Sood MD sent at 12/18/2018  7:54 AM CST -----  Renal function is a little better. I encourage pt to eat meals and hydrate with water.  She should recheck labs and f.u with me in January

## 2018-12-21 RX ORDER — MELOXICAM 15 MG/1
TABLET ORAL
Qty: 90 TABLET | Refills: 0 | Status: SHIPPED | OUTPATIENT
Start: 2018-12-21 | End: 2019-01-07

## 2018-12-21 RX ORDER — FESOTERODINE FUMARATE 4 MG/1
TABLET, FILM COATED, EXTENDED RELEASE ORAL
Qty: 90 TABLET | Refills: 1 | Status: SHIPPED | OUTPATIENT
Start: 2018-12-21 | End: 2018-12-23

## 2018-12-21 NOTE — TELEPHONE ENCOUNTER
Future appt:     Your appointments     Date & Time Appointment Department [facility])    Jan 07, 2019  9:30 AM CST Exam - Established Patient with Eduardo Paz MD [facility] ([facility])    Jan 18, 20

## 2018-12-22 ENCOUNTER — TELEPHONE (OUTPATIENT)
Dept: FAMILY MEDICINE CLINIC | Facility: CLINIC | Age: 83
End: 2018-12-22

## 2018-12-22 NOTE — TELEPHONE ENCOUNTER
Fax came from [address] in [place] requesting change in medication from [address] to something cheaper such as Detrol or oxybutin. Script will be $997 for patient.      Called patient and she would like to change to another medication as this is too

## 2018-12-23 RX ORDER — OXYBUTYNIN CHLORIDE 5 MG/1
5 TABLET, EXTENDED RELEASE ORAL DAILY
Qty: 30 TABLET | Refills: 1 | Status: SHIPPED | OUTPATIENT
Start: 2018-12-23 | End: 2019-05-05

## 2019-01-07 ENCOUNTER — APPOINTMENT (OUTPATIENT)
Dept: LAB | Age: 84
End: 2019-01-07
Attending: FAMILY MEDICINE
Payer: MEDICARE

## 2019-01-07 ENCOUNTER — TELEPHONE (OUTPATIENT)
Dept: FAMILY MEDICINE CLINIC | Facility: CLINIC | Age: 84
End: 2019-01-07

## 2019-01-07 ENCOUNTER — OFFICE VISIT (OUTPATIENT)
Dept: FAMILY MEDICINE CLINIC | Facility: CLINIC | Age: 84
End: 2019-01-07
Payer: MEDICARE

## 2019-01-07 VITALS
HEART RATE: 58 BPM | SYSTOLIC BLOOD PRESSURE: 148 MMHG | BODY MASS INDEX: 33.54 KG/M2 | RESPIRATION RATE: 22 BRPM | WEIGHT: 157.63 LBS | HEIGHT: 57.5 IN | TEMPERATURE: 97 F | DIASTOLIC BLOOD PRESSURE: 76 MMHG | OXYGEN SATURATION: 91 %

## 2019-01-07 DIAGNOSIS — J84.112 IDIOPATHIC FIBROSING ALVEOLITIS (HCC): ICD-10-CM

## 2019-01-07 DIAGNOSIS — J44.1 OBSTRUCTIVE CHRONIC BRONCHITIS WITH EXACERBATION (HCC): ICD-10-CM

## 2019-01-07 DIAGNOSIS — I10 ESSENTIAL HYPERTENSION: Primary | ICD-10-CM

## 2019-01-07 DIAGNOSIS — N18.30 STAGE 3 CHRONIC KIDNEY DISEASE (HCC): ICD-10-CM

## 2019-01-07 DIAGNOSIS — N28.9 RENAL INSUFFICIENCY: ICD-10-CM

## 2019-01-07 LAB
ANION GAP SERPL CALC-SCNC: 10 MMOL/L (ref 0–18)
BUN BLD-MCNC: 14 MG/DL (ref 8–20)
BUN/CREAT SERPL: 13 (ref 10–20)
CALCIUM BLD-MCNC: 8.9 MG/DL (ref 8.3–10.3)
CHLORIDE SERPL-SCNC: 102 MMOL/L (ref 101–111)
CO2 SERPL-SCNC: 24 MMOL/L (ref 22–32)
CREAT BLD-MCNC: 1.08 MG/DL (ref 0.55–1.02)
GLUCOSE BLD-MCNC: 101 MG/DL (ref 70–99)
OSMOLALITY SERPL CALC.SUM OF ELEC: 283 MOSM/KG (ref 275–295)
POTASSIUM SERPL-SCNC: 4.5 MMOL/L (ref 3.6–5.1)
SODIUM SERPL-SCNC: 136 MMOL/L (ref 136–144)

## 2019-01-07 PROCEDURE — 80048 BASIC METABOLIC PNL TOTAL CA: CPT

## 2019-01-07 PROCEDURE — 36415 COLL VENOUS BLD VENIPUNCTURE: CPT

## 2019-01-07 PROCEDURE — 99214 OFFICE O/P EST MOD 30 MIN: CPT | Performed by: FAMILY MEDICINE

## 2019-01-07 NOTE — PROGRESS NOTES
[address]  PROGRESS NOTE  Chief Complaint:   Patient presents with: Follow - Up      HPI:   This is a 80year old female coming in for medical f.u  Good spirits stated they had good holiday time. Pt trying to eat and drink water.  Kvng [place] Marital status:       Spouse name: Not on file      Number of children: Not on file      Years of education: Not on file      Highest education level: Not on file    Tobacco Use      Smoking status: Never Smoker      Smokeless tobacco: Never Used Oral Cap Take 1 tablet by mouth as needed. Disp:  Rfl:       Counseling given: Not Answered       REVIEW OF SYSTEMS:   CONSTITUTIONAL:  Denies unusual weight gain/loss, fever, chills, or fatigue.   EENT:  Eyes:  Denies eye pain, visual loss, blurred visio clear bilaterally, no eye discharge Ears: External normal. Nose: patent, no nasal discharge Throat:  No tonsillar erythema or exudate. Mouth:  No oral lesions or ulcerations, good dentition. NECK: Supple, no thyromegaly.   SKIN: No rashes, no skin lesion, Instructions   Lab today-- recheck lab - renal functions    Pt to call back with info re medications she stopped.   Further recommendations pending that information    Do not take MOBIC / MELOXICAM    Tylenol for any aches/ pains or fevers            Meds &

## 2019-01-07 NOTE — TELEPHONE ENCOUNTER
----- Message from Bertha Rice MD sent at 1/7/2019  4:07 PM CST -----  Patient seen earlier today. Laboratory showing stable renal function. Patient to continue with current meds and care recheck in 2-3 months.

## 2019-01-07 NOTE — PATIENT INSTRUCTIONS
Lab today-- recheck lab - renal functions    Pt to call back with info re medications she stopped.   Further recommendations pending that information    Do not take MOBIC / MELOXICAM    Tylenol for any aches/ pains or fevers

## 2019-01-16 RX ORDER — MELOXICAM 15 MG/1
TABLET ORAL
Qty: 90 TABLET | Refills: 0 | OUTPATIENT
Start: 2019-01-16

## 2019-01-18 ENCOUNTER — OFFICE VISIT (OUTPATIENT)
Dept: FAMILY MEDICINE CLINIC | Facility: CLINIC | Age: 84
End: 2019-01-18
Payer: MEDICARE

## 2019-01-18 VITALS
BODY MASS INDEX: 33.26 KG/M2 | WEIGHT: 156.31 LBS | SYSTOLIC BLOOD PRESSURE: 144 MMHG | RESPIRATION RATE: 20 BRPM | OXYGEN SATURATION: 91 % | HEART RATE: 54 BPM | HEIGHT: 57.5 IN | DIASTOLIC BLOOD PRESSURE: 74 MMHG | TEMPERATURE: 98 F

## 2019-01-18 DIAGNOSIS — I10 ESSENTIAL HYPERTENSION: Primary | ICD-10-CM

## 2019-01-18 DIAGNOSIS — J84.112 IDIOPATHIC FIBROSING ALVEOLITIS (HCC): ICD-10-CM

## 2019-01-18 DIAGNOSIS — N18.30 STAGE 3 CHRONIC KIDNEY DISEASE (HCC): ICD-10-CM

## 2019-01-18 DIAGNOSIS — I89.0 CHRONIC ACQUIRED LYMPHEDEMA: ICD-10-CM

## 2019-01-18 DIAGNOSIS — M15.9 PRIMARY OSTEOARTHRITIS INVOLVING MULTIPLE JOINTS: ICD-10-CM

## 2019-01-18 PROCEDURE — 99214 OFFICE O/P EST MOD 30 MIN: CPT | Performed by: FAMILY MEDICINE

## 2019-01-18 NOTE — PATIENT INSTRUCTIONS
Pt to take tylenol for pain.   Ok for daily aspirin     NO ADVIL, ALEVE, MELOXICAM  Continue other meds    Rec support stockings      Recheck early March

## 2019-01-18 NOTE — PROGRESS NOTES
[address]  PROGRESS NOTE  Chief Complaint:   Patient presents with: Follow - Up      HPI:   This is a 80year old female coming in for f.u  Labs done last week    Breathing stable- on chronic oxygen  Patient maintaining her weight.   Co History    Socioeconomic History      Marital status:       Spouse name: Not on file      Number of children: Not on file      Years of education: Not on file      Highest education level: Not on file    Tobacco Use      Smoking status: Never Smoker (CALCIUM 600) 1500 (600 Ca) MG Oral Tab Take 2 tablets by mouth 3 (three) times a week. Disp:  Rfl:    Loratadine (CLARITIN) 10 MG Oral Cap Take 1 tablet by mouth as needed.    Disp:  Rfl:       Counseling given: Not Answered       REVIEW OF SYSTEMS:   CO well nourished, no apparent distress. Hunched forward walking with a walker  HEENT:  Head:  Normocephalic, atraumatic Eyes: EOMI,  SKIN: No rashes, no skin lesion, no bruising, good turgor. HEART:  Regular rate and rhythm, no murmurs, rubs or gallops.   L MELOXICAM  Continue other meds    Rec support stockings      Recheck early March      Meds & Refills for this Visit:  Requested Prescriptions      No prescriptions requested or ordered in this encounter       Health Maintenance:        Ifrah Laura M

## 2019-01-22 RX ORDER — MELOXICAM 15 MG/1
TABLET ORAL
Qty: 90 TABLET | Refills: 0 | OUTPATIENT
Start: 2019-01-22

## 2019-02-20 RX ORDER — LEVOTHYROXINE SODIUM 0.07 MG/1
TABLET ORAL
Qty: 90 TABLET | Refills: 1 | Status: SHIPPED | OUTPATIENT
Start: 2019-02-20 | End: 2019-03-21

## 2019-02-20 RX ORDER — FUROSEMIDE 20 MG/1
TABLET ORAL
Qty: 90 TABLET | Refills: 0 | Status: SHIPPED | OUTPATIENT
Start: 2019-02-20 | End: 2019-07-04

## 2019-02-20 NOTE — TELEPHONE ENCOUNTER
Future appt:     Your appointments     Date & Time Appointment Department [facility])    Mar 20, 2019  3:00 PM CDT Exam - Established Patient with Yenifer Robin MD [address] [facility])        [facility]

## 2019-03-20 ENCOUNTER — LAB ENCOUNTER (OUTPATIENT)
Dept: LAB | Age: 84
End: 2019-03-20
Attending: FAMILY MEDICINE
Payer: MEDICARE

## 2019-03-20 ENCOUNTER — OFFICE VISIT (OUTPATIENT)
Dept: FAMILY MEDICINE CLINIC | Facility: CLINIC | Age: 84
End: 2019-03-20
Payer: MEDICARE

## 2019-03-20 VITALS
WEIGHT: 156.38 LBS | HEART RATE: 62 BPM | BODY MASS INDEX: 33.28 KG/M2 | OXYGEN SATURATION: 91 % | DIASTOLIC BLOOD PRESSURE: 72 MMHG | RESPIRATION RATE: 24 BRPM | SYSTOLIC BLOOD PRESSURE: 156 MMHG | TEMPERATURE: 97 F | HEIGHT: 57.5 IN

## 2019-03-20 DIAGNOSIS — E06.3 HYPOTHYROIDISM DUE TO HASHIMOTO'S THYROIDITIS: ICD-10-CM

## 2019-03-20 DIAGNOSIS — M81.0 AGE-RELATED OSTEOPOROSIS WITHOUT CURRENT PATHOLOGICAL FRACTURE: ICD-10-CM

## 2019-03-20 DIAGNOSIS — R53.83 FATIGUE, UNSPECIFIED TYPE: ICD-10-CM

## 2019-03-20 DIAGNOSIS — E03.8 HYPOTHYROIDISM DUE TO HASHIMOTO'S THYROIDITIS: ICD-10-CM

## 2019-03-20 DIAGNOSIS — J84.112 IDIOPATHIC FIBROSING ALVEOLITIS (HCC): ICD-10-CM

## 2019-03-20 DIAGNOSIS — I10 ESSENTIAL HYPERTENSION: Primary | ICD-10-CM

## 2019-03-20 DIAGNOSIS — I10 ESSENTIAL HYPERTENSION: ICD-10-CM

## 2019-03-20 DIAGNOSIS — J44.1 OBSTRUCTIVE CHRONIC BRONCHITIS WITH EXACERBATION (HCC): ICD-10-CM

## 2019-03-20 DIAGNOSIS — L57.0 KERATOSIS: ICD-10-CM

## 2019-03-20 LAB
ALBUMIN SERPL-MCNC: 3.6 G/DL (ref 3.4–5)
ALBUMIN/GLOB SERPL: 1 {RATIO} (ref 1–2)
ALP LIVER SERPL-CCNC: 93 U/L (ref 55–142)
ALT SERPL-CCNC: 15 U/L (ref 13–56)
ANION GAP SERPL CALC-SCNC: 10 MMOL/L (ref 0–18)
AST SERPL-CCNC: 22 U/L (ref 15–37)
BASOPHILS # BLD AUTO: 0.04 X10(3) UL (ref 0–0.2)
BASOPHILS NFR BLD AUTO: 0.5 %
BILIRUB SERPL-MCNC: 0.8 MG/DL (ref 0.1–2)
BILIRUB UR QL STRIP.AUTO: NEGATIVE
BUN BLD-MCNC: 16 MG/DL (ref 7–18)
BUN/CREAT SERPL: 17.6 (ref 10–20)
CALCIUM BLD-MCNC: 9.1 MG/DL (ref 8.5–10.1)
CHLORIDE SERPL-SCNC: 102 MMOL/L (ref 98–107)
CLARITY UR REFRACT.AUTO: CLEAR
CO2 SERPL-SCNC: 25 MMOL/L (ref 21–32)
COLOR UR AUTO: YELLOW
CREAT BLD-MCNC: 0.91 MG/DL (ref 0.55–1.02)
DEPRECATED RDW RBC AUTO: 46 FL (ref 35.1–46.3)
EOSINOPHIL # BLD AUTO: 0.19 X10(3) UL (ref 0–0.7)
EOSINOPHIL NFR BLD AUTO: 2.5 %
ERYTHROCYTE [DISTWIDTH] IN BLOOD BY AUTOMATED COUNT: 14.4 % (ref 11–15)
GLOBULIN PLAS-MCNC: 3.6 G/DL (ref 2.8–4.4)
GLUCOSE BLD-MCNC: 105 MG/DL (ref 70–99)
GLUCOSE UR STRIP.AUTO-MCNC: NEGATIVE MG/DL
HAV IGM SER QL: 1.8 MG/DL (ref 1.6–2.6)
HCT VFR BLD AUTO: 41.1 % (ref 35–48)
HGB BLD-MCNC: 13.8 G/DL (ref 12–16)
IMM GRANULOCYTES # BLD AUTO: 0.02 X10(3) UL (ref 0–1)
IMM GRANULOCYTES NFR BLD: 0.3 %
KETONES UR STRIP.AUTO-MCNC: NEGATIVE MG/DL
LEUKOCYTE ESTERASE UR QL STRIP.AUTO: NEGATIVE
LYMPHOCYTES # BLD AUTO: 1.88 X10(3) UL (ref 1–4)
LYMPHOCYTES NFR BLD AUTO: 25.1 %
M PROTEIN MFR SERPL ELPH: 7.2 G/DL (ref 6.4–8.2)
MCH RBC QN AUTO: 29.5 PG (ref 26–34)
MCHC RBC AUTO-ENTMCNC: 33.6 G/DL (ref 31–37)
MCV RBC AUTO: 87.8 FL (ref 80–100)
MONOCYTES # BLD AUTO: 0.68 X10(3) UL (ref 0.1–1)
MONOCYTES NFR BLD AUTO: 9.1 %
NEUTROPHILS # BLD AUTO: 4.68 X10 (3) UL (ref 1.5–7.7)
NEUTROPHILS # BLD AUTO: 4.68 X10(3) UL (ref 1.5–7.7)
NEUTROPHILS NFR BLD AUTO: 62.5 %
NITRITE UR QL STRIP.AUTO: NEGATIVE
OSMOLALITY SERPL CALC.SUM OF ELEC: 286 MOSM/KG (ref 275–295)
PH UR STRIP.AUTO: 7 [PH] (ref 4.5–8)
PLATELET # BLD AUTO: 221 10(3)UL (ref 150–450)
POTASSIUM SERPL-SCNC: 4 MMOL/L (ref 3.5–5.1)
PROT UR STRIP.AUTO-MCNC: NEGATIVE MG/DL
RBC # BLD AUTO: 4.68 X10(6)UL (ref 3.8–5.3)
RBC UR QL AUTO: NEGATIVE
SODIUM SERPL-SCNC: 137 MMOL/L (ref 136–145)
SP GR UR STRIP.AUTO: 1.01 (ref 1–1.03)
T4 FREE SERPL-MCNC: 1.2 NG/DL (ref 0.8–1.7)
TSI SER-ACNC: 6.77 MIU/ML (ref 0.36–3.74)
UROBILINOGEN UR STRIP.AUTO-MCNC: 1 MG/DL
VIT B12 SERPL-MCNC: 242 PG/ML (ref 193–986)
VIT D+METAB SERPL-MCNC: 22.8 NG/ML (ref 30–100)
WBC # BLD AUTO: 7.5 X10(3) UL (ref 4–11)

## 2019-03-20 PROCEDURE — 82306 VITAMIN D 25 HYDROXY: CPT

## 2019-03-20 PROCEDURE — 80053 COMPREHEN METABOLIC PANEL: CPT

## 2019-03-20 PROCEDURE — 99214 OFFICE O/P EST MOD 30 MIN: CPT | Performed by: FAMILY MEDICINE

## 2019-03-20 PROCEDURE — 84443 ASSAY THYROID STIM HORMONE: CPT

## 2019-03-20 PROCEDURE — 84439 ASSAY OF FREE THYROXINE: CPT

## 2019-03-20 PROCEDURE — 83735 ASSAY OF MAGNESIUM: CPT

## 2019-03-20 PROCEDURE — 36415 COLL VENOUS BLD VENIPUNCTURE: CPT

## 2019-03-20 PROCEDURE — 82607 VITAMIN B-12: CPT

## 2019-03-20 PROCEDURE — 85025 COMPLETE CBC W/AUTO DIFF WBC: CPT

## 2019-03-20 PROCEDURE — 81003 URINALYSIS AUTO W/O SCOPE: CPT

## 2019-03-20 NOTE — PROGRESS NOTES
[address]  PROGRESS NOTE  Chief Complaint:   Patient presents with: Follow - Up: 2 month f/u      HPI:   This is a 80year old female coming in for medical f.u    Pt not feeling as well. . Patient did realize she did not have portable o pulmonary disease) ([facility] [address]    • Essential hypertension    • Hyperlipidemia      Past Surgical History:   Procedure Laterality Date   • BACK SURGERY  1953    spinal fusion   • CATARACT Bilateral    • COLONOSCOPY       Social History:  Social History    Socioe mouth daily. Disp:  Rfl:    Calcium Carbonate (CALCIUM 600) 1500 (600 Ca) MG Oral Tab Take 2 tablets by mouth 3 (three) times a week. Disp:  Rfl:    Loratadine (CLARITIN) 10 MG Oral Cap Take 1 tablet by mouth as needed.    Disp:  Rfl:    Potassium Chlor Weight as of this encounter: 156 lb 6.4 oz. Vital signs reviewed. Appears stated age, well groomed.   Physical Exam:  GEN:  Patient is alert, awake and oriented, patient dyspneic with walking respiratory rate dropped below 20 has she rested and had her oxy poorly  - TSH W REFLEX TO FREE T4; Future    5. Fatigue, unspecified type  Laboratory studies today. Further recommendations pending test results. - CBC WITH DIFFERENTIAL WITH PLATELET; Future  - COMP METABOLIC PANEL (14); Future  - MAGNESIUM;  Future  -

## 2019-03-21 ENCOUNTER — TELEPHONE (OUTPATIENT)
Dept: FAMILY MEDICINE CLINIC | Facility: CLINIC | Age: 84
End: 2019-03-21

## 2019-03-21 RX ORDER — CYANOCOBALAMIN 1000 UG/ML
1000 INJECTION INTRAMUSCULAR; SUBCUTANEOUS
Qty: 1 VIAL | Refills: 11 | COMMUNITY
Start: 2019-03-21 | End: 2020-02-10

## 2019-03-21 NOTE — TELEPHONE ENCOUNTER
----- Message from Omar Nyhan, MD sent at 3/21/2019  9:25 AM CDT -----  Lab results reviewed  Urine negative   tsh elevated 6.7, free T4 1.2    b12- LOW  Chemistry- nonfasting, normal  Vit D - low  Cbc-normal    I rec patient take vit d suppliment w

## 2019-03-21 NOTE — TELEPHONE ENCOUNTER
Informed pt of her blood work results. Pt has been schedule for B12 injection on Monday. Please place orders. Pt will also take Vit D one tab weekly for 3 months. Pt expressed understanding and thanks.

## 2019-03-22 RX ORDER — POTASSIUM CHLORIDE 20 MEQ/1
TABLET, EXTENDED RELEASE ORAL
Qty: 180 TABLET | Refills: 0 | Status: SHIPPED | OUTPATIENT
Start: 2019-03-22 | End: 2019-07-04

## 2019-03-22 NOTE — TELEPHONE ENCOUNTER
Please advise refill of Potassium 20mEq. Last Rx: 5/30/18    Future appt:     Your appointments     Date & Time Appointment Department [facility])    Mar 25, 2019 10:00 AM CDT Injection with  [address] ([place]

## 2019-03-23 ENCOUNTER — NURSE ONLY (OUTPATIENT)
Dept: FAMILY MEDICINE CLINIC | Facility: CLINIC | Age: 84
End: 2019-03-23
Payer: MEDICARE

## 2019-03-23 PROCEDURE — 96372 THER/PROPH/DIAG INJ SC/IM: CPT | Performed by: FAMILY MEDICINE

## 2019-03-23 RX ORDER — CYANOCOBALAMIN 1000 UG/ML
1000 INJECTION INTRAMUSCULAR; SUBCUTANEOUS ONCE
Status: COMPLETED | OUTPATIENT
Start: 2019-03-23 | End: 2019-03-23

## 2019-03-23 RX ADMIN — CYANOCOBALAMIN 1000 MCG: 1000 INJECTION INTRAMUSCULAR; SUBCUTANEOUS at 12:02:00

## 2019-04-01 ENCOUNTER — NURSE ONLY (OUTPATIENT)
Dept: FAMILY MEDICINE CLINIC | Facility: CLINIC | Age: 84
End: 2019-04-01
Payer: MEDICARE

## 2019-04-01 DIAGNOSIS — E53.8 B12 DEFICIENCY: Primary | ICD-10-CM

## 2019-04-01 PROCEDURE — 96372 THER/PROPH/DIAG INJ SC/IM: CPT | Performed by: FAMILY MEDICINE

## 2019-04-01 RX ORDER — CYANOCOBALAMIN 1000 UG/ML
1000 INJECTION INTRAMUSCULAR; SUBCUTANEOUS ONCE
Status: COMPLETED | OUTPATIENT
Start: 2019-04-01 | End: 2019-04-01

## 2019-04-01 RX ADMIN — CYANOCOBALAMIN 1000 MCG: 1000 INJECTION INTRAMUSCULAR; SUBCUTANEOUS at 13:30:00

## 2019-04-01 NOTE — PROGRESS NOTES
Patient here for #2 of 4 weekly B12 injections. After 4 injections patient will go to monthly B12 injections. Last B12 was 3/23/19 and was given in right deltoid. Advised patient to take injection in left arm today.  Patient declined stating she is ha

## 2019-04-08 ENCOUNTER — NURSE ONLY (OUTPATIENT)
Dept: FAMILY MEDICINE CLINIC | Facility: CLINIC | Age: 84
End: 2019-04-08
Payer: MEDICARE

## 2019-04-08 DIAGNOSIS — E53.8 B12 DEFICIENCY: Primary | ICD-10-CM

## 2019-04-08 PROCEDURE — 96372 THER/PROPH/DIAG INJ SC/IM: CPT | Performed by: FAMILY MEDICINE

## 2019-04-08 RX ORDER — CYANOCOBALAMIN 1000 UG/ML
1000 INJECTION INTRAMUSCULAR; SUBCUTANEOUS ONCE
Status: COMPLETED | OUTPATIENT
Start: 2019-04-08 | End: 2019-04-08

## 2019-04-08 RX ADMIN — CYANOCOBALAMIN 1000 MCG: 1000 INJECTION INTRAMUSCULAR; SUBCUTANEOUS at 10:08:00

## 2019-04-08 NOTE — PROGRESS NOTES
Patient here for #3 weekly B12 injection. Next week will be last weekly B12, then will start monthly B12 injections. B12 injection given today in left deltoid and well tolerated by patient. Patient advised to schedule next injection in one week.

## 2019-04-15 ENCOUNTER — NURSE ONLY (OUTPATIENT)
Dept: FAMILY MEDICINE CLINIC | Facility: CLINIC | Age: 84
End: 2019-04-15
Payer: MEDICARE

## 2019-04-15 DIAGNOSIS — E53.8 B12 DEFICIENCY: Primary | ICD-10-CM

## 2019-04-15 PROCEDURE — 96372 THER/PROPH/DIAG INJ SC/IM: CPT | Performed by: FAMILY MEDICINE

## 2019-04-15 RX ORDER — CYANOCOBALAMIN 1000 UG/ML
1000 INJECTION INTRAMUSCULAR; SUBCUTANEOUS ONCE
Status: COMPLETED | OUTPATIENT
Start: 2019-04-15 | End: 2019-04-15

## 2019-04-15 RX ADMIN — CYANOCOBALAMIN 1000 MCG: 1000 INJECTION INTRAMUSCULAR; SUBCUTANEOUS at 11:12:00

## 2019-04-15 NOTE — PROGRESS NOTES
Patient here for 4th weekly B12 injection. After today's injection she will now go to monthly injections per Dr. Cesario Villafuerte given right deltoid and well tolerated by patient. Patient advised to schedule next B12 injection in one month.

## 2019-05-06 RX ORDER — OXYBUTYNIN CHLORIDE 5 MG/1
5 TABLET, EXTENDED RELEASE ORAL DAILY
Qty: 90 TABLET | Refills: 1 | Status: SHIPPED | OUTPATIENT
Start: 2019-05-06 | End: 2019-07-24

## 2019-05-06 NOTE — TELEPHONE ENCOUNTER
Future Appointments   Date Time Provider Vicente Diane   5/13/2019 10:00 AM [facility] NURSE [facility] [place]

## 2019-05-15 ENCOUNTER — NURSE ONLY (OUTPATIENT)
Dept: FAMILY MEDICINE CLINIC | Facility: CLINIC | Age: 84
End: 2019-05-15
Payer: MEDICARE

## 2019-05-15 DIAGNOSIS — E53.8 B12 DEFICIENCY: Primary | ICD-10-CM

## 2019-05-15 PROCEDURE — 96372 THER/PROPH/DIAG INJ SC/IM: CPT | Performed by: FAMILY MEDICINE

## 2019-05-15 RX ORDER — CYANOCOBALAMIN 1000 UG/ML
1000 INJECTION INTRAMUSCULAR; SUBCUTANEOUS ONCE
Status: COMPLETED | OUTPATIENT
Start: 2019-05-15 | End: 2019-05-15

## 2019-05-15 RX ADMIN — CYANOCOBALAMIN 1000 MCG: 1000 INJECTION INTRAMUSCULAR; SUBCUTANEOUS at 10:04:00

## 2019-05-15 NOTE — PROGRESS NOTES
Patient here for monthly B12 injection as ordered by Dr. Blaine Campos on 3/21/19  Last B12 injection given on 4/15/19    B12 injection given today in left deltoid. Well tolerated by patient.

## 2019-06-19 ENCOUNTER — NURSE ONLY (OUTPATIENT)
Dept: FAMILY MEDICINE CLINIC | Facility: CLINIC | Age: 84
End: 2019-06-19
Payer: MEDICARE

## 2019-06-19 DIAGNOSIS — E53.8 B12 DEFICIENCY: Primary | ICD-10-CM

## 2019-06-19 PROCEDURE — 96372 THER/PROPH/DIAG INJ SC/IM: CPT | Performed by: FAMILY MEDICINE

## 2019-06-19 RX ORDER — CYANOCOBALAMIN 1000 UG/ML
1000 INJECTION INTRAMUSCULAR; SUBCUTANEOUS ONCE
Status: COMPLETED | OUTPATIENT
Start: 2019-06-19 | End: 2019-06-19

## 2019-06-19 RX ADMIN — CYANOCOBALAMIN 1000 MCG: 1000 INJECTION INTRAMUSCULAR; SUBCUTANEOUS at 13:23:00

## 2019-06-19 NOTE — PROGRESS NOTES
Patient here for monthly B12 injection as ordered by Dr. Angella Toledo on 3/21/20. Last B12 injection given on 5/15/19. B12 given today IM right deltoid. Well tolerated by patient.

## 2019-06-28 DIAGNOSIS — E55.9 VITAMIN D DEFICIENCY: ICD-10-CM

## 2019-06-28 RX ORDER — ERGOCALCIFEROL 1.25 MG/1
CAPSULE ORAL
Qty: 12 CAPSULE | Refills: 0 | OUTPATIENT
Start: 2019-06-28

## 2019-06-28 NOTE — TELEPHONE ENCOUNTER
Future appt: Your appointments     Date & Time Appointment Department [facility])    Jul 19, 2019  1:15 PM CDT Injection with [facility] (Stephane Bailon)            [address] [facility] [place] [address] Reyna Daniel [facility] [address]  [phone]        Last Appointment:  3/20/2019    Pt was started on Ergocalciferol on 3/21/19. Called pt, states she is not done with current therapy. Pt reminded that she will need labs when she is done with her supplement. Pt agreed to call back to make appt. Cholesterol, Total (mg/dL)   Date Value   12/13/2018 184     HDL Cholesterol (mg/dL)   Date Value   12/13/2018 73 (H)     LDL Cholesterol (mg/dL)   Date Value   12/13/2018 91     Triglycerides (mg/dL)   Date Value   12/13/2018 100     No results found for: EAG, A1C  Lab Results   Component Value Date    T4F 1.2 03/20/2019    TSH 6.770 (H) 03/20/2019       No follow-ups on file.

## 2019-07-05 RX ORDER — FUROSEMIDE 20 MG/1
TABLET ORAL
Qty: 90 TABLET | Refills: 0 | Status: SHIPPED | OUTPATIENT
Start: 2019-07-05 | End: 2019-10-03

## 2019-07-05 RX ORDER — POTASSIUM CHLORIDE 20 MEQ/1
TABLET, EXTENDED RELEASE ORAL
Qty: 180 TABLET | Refills: 0 | Status: SHIPPED | OUTPATIENT
Start: 2019-07-05 | End: 2019-10-03

## 2019-07-05 NOTE — TELEPHONE ENCOUNTER
Future appt:     Your appointments     Date & Time Appointment Department [facility])    Jul 19, 2019  1:15 PM CDT Injection with [facility])            [address]

## 2019-07-10 ENCOUNTER — APPOINTMENT (OUTPATIENT)
Dept: LAB | Age: 84
End: 2019-07-10
Attending: FAMILY MEDICINE
Payer: MEDICARE

## 2019-07-10 DIAGNOSIS — E55.9 VITAMIN D DEFICIENCY: ICD-10-CM

## 2019-07-10 DIAGNOSIS — R53.83 FATIGUE, UNSPECIFIED TYPE: ICD-10-CM

## 2019-07-10 DIAGNOSIS — E53.8 VITAMIN B12 DEFICIENCY: ICD-10-CM

## 2019-07-10 LAB
ALBUMIN SERPL-MCNC: 3.7 G/DL (ref 3.4–5)
ALBUMIN/GLOB SERPL: 1.1 {RATIO} (ref 1–2)
ALP LIVER SERPL-CCNC: 79 U/L (ref 55–142)
ALT SERPL-CCNC: 16 U/L (ref 13–56)
ANION GAP SERPL CALC-SCNC: 10 MMOL/L (ref 0–18)
AST SERPL-CCNC: 20 U/L (ref 15–37)
BILIRUB SERPL-MCNC: 0.8 MG/DL (ref 0.1–2)
BUN BLD-MCNC: 19 MG/DL (ref 7–18)
BUN/CREAT SERPL: 18.3 (ref 10–20)
CALCIUM BLD-MCNC: 9.2 MG/DL (ref 8.5–10.1)
CHLORIDE SERPL-SCNC: 101 MMOL/L (ref 98–112)
CO2 SERPL-SCNC: 23 MMOL/L (ref 21–32)
CREAT BLD-MCNC: 1.04 MG/DL (ref 0.55–1.02)
GLOBULIN PLAS-MCNC: 3.5 G/DL (ref 2.8–4.4)
GLUCOSE BLD-MCNC: 103 MG/DL (ref 70–99)
M PROTEIN MFR SERPL ELPH: 7.2 G/DL (ref 6.4–8.2)
OSMOLALITY SERPL CALC.SUM OF ELEC: 281 MOSM/KG (ref 275–295)
POTASSIUM SERPL-SCNC: 4.3 MMOL/L (ref 3.5–5.1)
SODIUM SERPL-SCNC: 134 MMOL/L (ref 136–145)
T4 FREE SERPL-MCNC: 1.1 NG/DL (ref 0.8–1.7)
TSI SER-ACNC: 10.5 MIU/ML (ref 0.36–3.74)
VIT B12 SERPL-MCNC: 728 PG/ML (ref 193–986)
VIT D+METAB SERPL-MCNC: 40.7 NG/ML (ref 30–100)

## 2019-07-10 PROCEDURE — 84443 ASSAY THYROID STIM HORMONE: CPT

## 2019-07-10 PROCEDURE — 82306 VITAMIN D 25 HYDROXY: CPT

## 2019-07-10 PROCEDURE — 80053 COMPREHEN METABOLIC PANEL: CPT

## 2019-07-10 PROCEDURE — 84439 ASSAY OF FREE THYROXINE: CPT

## 2019-07-10 PROCEDURE — 36415 COLL VENOUS BLD VENIPUNCTURE: CPT

## 2019-07-10 PROCEDURE — 82607 VITAMIN B-12: CPT

## 2019-07-12 ENCOUNTER — TELEPHONE (OUTPATIENT)
Dept: FAMILY MEDICINE CLINIC | Facility: CLINIC | Age: 84
End: 2019-07-12

## 2019-07-12 NOTE — TELEPHONE ENCOUNTER
Informed pt of her blood work results. Pt will drink Gatorade and some soup broth to increase her sodium level. Pt did admit she does miss some doses of synthroid. Pt will try to take her meds regularly and call back to schedule appt for TSH in one month. Pt expressed understanding and thanks.

## 2019-07-19 ENCOUNTER — NURSE ONLY (OUTPATIENT)
Dept: FAMILY MEDICINE CLINIC | Facility: CLINIC | Age: 84
End: 2019-07-19
Payer: MEDICARE

## 2019-07-19 DIAGNOSIS — E53.8 B12 DEFICIENCY: Primary | ICD-10-CM

## 2019-07-19 PROCEDURE — 96372 THER/PROPH/DIAG INJ SC/IM: CPT | Performed by: FAMILY MEDICINE

## 2019-07-19 RX ORDER — CYANOCOBALAMIN 1000 UG/ML
1000 INJECTION INTRAMUSCULAR; SUBCUTANEOUS ONCE
Status: COMPLETED | OUTPATIENT
Start: 2019-07-19 | End: 2019-07-19

## 2019-07-19 RX ADMIN — CYANOCOBALAMIN 1000 MCG: 1000 INJECTION INTRAMUSCULAR; SUBCUTANEOUS at 13:32:00

## 2019-07-19 NOTE — PROGRESS NOTES
Pt had labs drawn on 3/20/19. Orders to start B12 injections given at that time per CR. Injection given left deltoid- IM,  Tolerated well. Last injection: 6/19/19. Pt has upcoming office visit on 8/2.     Future Appointments   Date Time Provider Departm

## 2019-07-24 ENCOUNTER — TELEPHONE (OUTPATIENT)
Dept: FAMILY MEDICINE CLINIC | Facility: CLINIC | Age: 84
End: 2019-07-24

## 2019-07-24 RX ORDER — OXYBUTYNIN CHLORIDE 5 MG/1
5 TABLET, EXTENDED RELEASE ORAL DAILY
Qty: 90 TABLET | Refills: 1 | Status: SHIPPED | OUTPATIENT
Start: 2019-07-24 | End: 2019-12-20

## 2019-07-24 NOTE — TELEPHONE ENCOUNTER
[id number] [address] for refill. Pt has appt soon.     Future Appointments   Date Time Provider Vicente Roxi   8/2/2019 11:30 AM Rafiq Barker MD [facility] [facility] [place]

## 2019-07-24 NOTE — TELEPHONE ENCOUNTER
Spoke with patient's daughter to go over patient's current medication list. Daughter states patient has only a few tablets left of her Oxybutynin Chloride. Last refill 5/6/19  Future appt:     Your appointments     Date & Time Appointment Department Cris

## 2019-08-02 ENCOUNTER — OFFICE VISIT (OUTPATIENT)
Dept: FAMILY MEDICINE CLINIC | Facility: CLINIC | Age: 84
End: 2019-08-02
Payer: MEDICARE

## 2019-08-02 VITALS
HEART RATE: 64 BPM | SYSTOLIC BLOOD PRESSURE: 142 MMHG | OXYGEN SATURATION: 92 % | DIASTOLIC BLOOD PRESSURE: 70 MMHG | BODY MASS INDEX: 33.97 KG/M2 | WEIGHT: 159.63 LBS | HEIGHT: 57.5 IN | TEMPERATURE: 98 F | RESPIRATION RATE: 18 BRPM

## 2019-08-02 DIAGNOSIS — M15.9 PRIMARY OSTEOARTHRITIS INVOLVING MULTIPLE JOINTS: ICD-10-CM

## 2019-08-02 DIAGNOSIS — J44.1 OBSTRUCTIVE CHRONIC BRONCHITIS WITH EXACERBATION (HCC): ICD-10-CM

## 2019-08-02 DIAGNOSIS — G45.9 TRANSIENT CEREBRAL ISCHEMIA, UNSPECIFIED TYPE: ICD-10-CM

## 2019-08-02 DIAGNOSIS — I10 ESSENTIAL HYPERTENSION: Primary | ICD-10-CM

## 2019-08-02 DIAGNOSIS — E78.49 FAMILIAL MULTIPLE LIPOPROTEIN-TYPE HYPERLIPIDEMIA: ICD-10-CM

## 2019-08-02 DIAGNOSIS — E03.8 HYPOTHYROIDISM DUE TO HASHIMOTO'S THYROIDITIS: ICD-10-CM

## 2019-08-02 DIAGNOSIS — E06.3 HYPOTHYROIDISM DUE TO HASHIMOTO'S THYROIDITIS: ICD-10-CM

## 2019-08-02 PROCEDURE — 99214 OFFICE O/P EST MOD 30 MIN: CPT | Performed by: FAMILY MEDICINE

## 2019-08-02 NOTE — PATIENT INSTRUCTIONS
D/w pt use of levothyroxine-- Pt should take LEVOTHYROXINE in morning by itself with water -- 1 hour before other meds or food    Redraw thyroid labs 1 month    rec pt elevate feet with sitting  Consider compression stocking for ankle/ leg swelling    Rech

## 2019-08-02 NOTE — PROGRESS NOTES
[address]  PROGRESS NOTE  Chief Complaint:   Patient presents with:   Follow - Up      HPI:   This is a 80year old female coming in for medical f.u-- came at 2pm for 1130 appt  \" mixed up appointment\"    Left foot more swollen at time History:   Procedure Laterality Date   • BACK SURGERY  1953    spinal fusion   • CATARACT Bilateral    • COLONOSCOPY       Social History:  Social History    Socioeconomic History      Marital status:       Spouse name: Not on file      Number of ch (three) times a week. Disp:  Rfl:    clotrimazole-betamethasone (LOTRISONE) 1-0.05 % External Cream Apply 1 Application topically as needed. Disp: 45 g Rfl: 0   aspirin 325 MG Oral Tab Take 325 mg by mouth daily.    Disp:  Rfl:    Calcium Carbonate (CALCI calculated from the following:    Height as of this encounter: 57.5\". Weight as of this encounter: 159 lb 9.6 oz. Vital signs reviewed. Appears stated age, well groomed.   Physical Exam:  GEN:  Patient is alert, awake and oriented, well developed, well water -- 1 hour before other meds or food    Redraw thyroid labs 1 month    rec pt elevate feet with sitting  Consider compression stocking for ankle/ leg swelling    Recheck 3 months          Meds & Refills for this Visit:  Requested Prescriptions      No

## 2019-08-28 ENCOUNTER — TELEPHONE (OUTPATIENT)
Dept: FAMILY MEDICINE CLINIC | Facility: CLINIC | Age: 84
End: 2019-08-28

## 2019-08-28 DIAGNOSIS — N18.30 STAGE 3 CHRONIC KIDNEY DISEASE (HCC): ICD-10-CM

## 2019-08-28 DIAGNOSIS — E06.3 HYPOTHYROIDISM DUE TO HASHIMOTO'S THYROIDITIS: Primary | ICD-10-CM

## 2019-08-28 DIAGNOSIS — E03.8 HYPOTHYROIDISM DUE TO HASHIMOTO'S THYROIDITIS: Primary | ICD-10-CM

## 2019-08-28 DIAGNOSIS — I10 ESSENTIAL HYPERTENSION: ICD-10-CM

## 2019-08-28 NOTE — TELEPHONE ENCOUNTER
----- Message from Shahram Kumar sent at 8/28/2019 10:38 AM CDT -----  Regarding: lab orders needed   Patient has lab appointment. Could you please put lab orders in system.           Thanks,  Aisha

## 2019-10-03 RX ORDER — FUROSEMIDE 20 MG/1
TABLET ORAL
Qty: 90 TABLET | Refills: 3 | Status: SHIPPED | OUTPATIENT
Start: 2019-10-03

## 2019-10-03 RX ORDER — POTASSIUM CHLORIDE 20 MEQ/1
TABLET, EXTENDED RELEASE ORAL
Qty: 180 TABLET | Refills: 3 | Status: SHIPPED | OUTPATIENT
Start: 2019-10-03 | End: 2021-01-01

## 2019-10-03 NOTE — TELEPHONE ENCOUNTER
Future appt:     Your appointments     Date & Time Appointment Department [facility])    Nov 05, 2019  2:30 PM CST Follow up with Ambrose Way MD [address] [facility] [place] ([place])

## 2019-10-04 RX ORDER — LEVOTHYROXINE SODIUM 0.1 MG/1
100 TABLET ORAL
Qty: 90 TABLET | Refills: 1 | OUTPATIENT
Start: 2019-10-04

## 2019-10-04 NOTE — TELEPHONE ENCOUNTER
Pt did schedule for her lab appt      Future Appointments   Date Time Provider Vicente Diane   10/12/2019  8:45 AM REF [facility] REF [facility]   11/5/2019  2:30 PM Alex Cabral MD [facility] [place]

## 2019-10-04 NOTE — TELEPHONE ENCOUNTER
Future appt:     Your appointments     Date & Time Appointment Department [facility])    Nov 05, 2019  2:30 PM CST Follow up with Sylvie Wiggins MD [facility]

## 2019-10-12 ENCOUNTER — APPOINTMENT (OUTPATIENT)
Dept: LAB | Age: 84
End: 2019-10-12
Attending: FAMILY MEDICINE
Payer: MEDICARE

## 2019-10-12 DIAGNOSIS — E06.3 HYPOTHYROIDISM DUE TO HASHIMOTO'S THYROIDITIS: ICD-10-CM

## 2019-10-12 DIAGNOSIS — E03.8 HYPOTHYROIDISM DUE TO HASHIMOTO'S THYROIDITIS: ICD-10-CM

## 2019-10-12 DIAGNOSIS — N18.30 STAGE 3 CHRONIC KIDNEY DISEASE (HCC): ICD-10-CM

## 2019-10-12 DIAGNOSIS — I10 ESSENTIAL HYPERTENSION: ICD-10-CM

## 2019-10-12 PROCEDURE — 84439 ASSAY OF FREE THYROXINE: CPT

## 2019-10-12 PROCEDURE — 84443 ASSAY THYROID STIM HORMONE: CPT

## 2019-10-12 PROCEDURE — 80053 COMPREHEN METABOLIC PANEL: CPT

## 2019-10-12 PROCEDURE — 36415 COLL VENOUS BLD VENIPUNCTURE: CPT

## 2019-10-14 ENCOUNTER — TELEPHONE (OUTPATIENT)
Dept: FAMILY MEDICINE CLINIC | Facility: CLINIC | Age: 84
End: 2019-10-14

## 2019-10-14 NOTE — TELEPHONE ENCOUNTER
----- Message from Junie Rivera MD sent at 10/14/2019  4:19 PM CDT -----  Abnormal lab results. Elevated tsh, normal T4   low sodium and decreased renal function.    Please check on patient- if any concerns for health- then acute appointment should

## 2019-10-15 NOTE — TELEPHONE ENCOUNTER
Pt states she feels fine. Pt will hydrate and add some salt in her diet. Pt has upcoming appt on 11/5 with CR. Please advise.

## 2019-10-22 ENCOUNTER — TELEPHONE (OUTPATIENT)
Dept: FAMILY MEDICINE CLINIC | Facility: CLINIC | Age: 84
End: 2019-10-22

## 2019-10-22 DIAGNOSIS — L60.2 NAIL THICKENING: Primary | ICD-10-CM

## 2019-10-22 NOTE — TELEPHONE ENCOUNTER
Pt states she has seen podiatry in the past to have toe nails trimmed, cannot recall who she has seen. Pt previously seen by podiatrist at [facility] years ago. Pt is not having any problems with her feet  Pt would like a referral so Medicare will cover.   Oth

## 2019-10-23 NOTE — TELEPHONE ENCOUNTER
Correct- pt was requesting referral for nail care only. No problems were reported by pt. Pt simply had stated that she didn't want to attempt trimming her toe nails on her own.

## 2019-10-23 NOTE — TELEPHONE ENCOUNTER
Potential referral to podiatry requested. I need a reason for a referral. Nail care referral can be given without appointment. Other issues may need an appointment. for evaluation.

## 2019-11-05 ENCOUNTER — OFFICE VISIT (OUTPATIENT)
Dept: FAMILY MEDICINE CLINIC | Facility: CLINIC | Age: 84
End: 2019-11-05
Payer: MEDICARE

## 2019-11-05 VITALS
TEMPERATURE: 98 F | SYSTOLIC BLOOD PRESSURE: 154 MMHG | OXYGEN SATURATION: 94 % | BODY MASS INDEX: 33.7 KG/M2 | HEIGHT: 57.5 IN | RESPIRATION RATE: 44 BRPM | WEIGHT: 158.38 LBS | HEART RATE: 112 BPM | DIASTOLIC BLOOD PRESSURE: 84 MMHG

## 2019-11-05 DIAGNOSIS — R00.0 TACHYCARDIA: ICD-10-CM

## 2019-11-05 DIAGNOSIS — N18.30 STAGE 3 CHRONIC KIDNEY DISEASE (HCC): ICD-10-CM

## 2019-11-05 DIAGNOSIS — E78.49 FAMILIAL MULTIPLE LIPOPROTEIN-TYPE HYPERLIPIDEMIA: ICD-10-CM

## 2019-11-05 DIAGNOSIS — R94.6 ABNORMAL RESULTS OF THYROID FUNCTION STUDIES: ICD-10-CM

## 2019-11-05 DIAGNOSIS — R26.9 ABNORMALITY OF GAIT: ICD-10-CM

## 2019-11-05 DIAGNOSIS — M15.9 PRIMARY OSTEOARTHRITIS INVOLVING MULTIPLE JOINTS: ICD-10-CM

## 2019-11-05 DIAGNOSIS — Z23 FLU VACCINE NEED: ICD-10-CM

## 2019-11-05 DIAGNOSIS — Z00.00 ENCOUNTER FOR ANNUAL HEALTH EXAMINATION: Primary | ICD-10-CM

## 2019-11-05 DIAGNOSIS — I10 ESSENTIAL HYPERTENSION: ICD-10-CM

## 2019-11-05 DIAGNOSIS — E06.3 HYPOTHYROIDISM DUE TO HASHIMOTO'S THYROIDITIS: ICD-10-CM

## 2019-11-05 DIAGNOSIS — E03.8 HYPOTHYROIDISM DUE TO HASHIMOTO'S THYROIDITIS: ICD-10-CM

## 2019-11-05 DIAGNOSIS — Z13.31 DEPRESSION SCREENING: ICD-10-CM

## 2019-11-05 DIAGNOSIS — J84.112 IDIOPATHIC FIBROSING ALVEOLITIS (HCC): ICD-10-CM

## 2019-11-05 DIAGNOSIS — I48.91 ATRIAL FIBRILLATION, NEW ONSET (HCC): ICD-10-CM

## 2019-11-05 DIAGNOSIS — J44.1 OBSTRUCTIVE CHRONIC BRONCHITIS WITH EXACERBATION (HCC): ICD-10-CM

## 2019-11-05 DIAGNOSIS — R06.02 SHORTNESS OF BREATH: ICD-10-CM

## 2019-11-05 PROCEDURE — G0444 DEPRESSION SCREEN ANNUAL: HCPCS | Performed by: FAMILY MEDICINE

## 2019-11-05 PROCEDURE — 93000 ELECTROCARDIOGRAM COMPLETE: CPT | Performed by: FAMILY MEDICINE

## 2019-11-05 PROCEDURE — 99214 OFFICE O/P EST MOD 30 MIN: CPT | Performed by: FAMILY MEDICINE

## 2019-11-05 PROCEDURE — G0439 PPPS, SUBSEQ VISIT: HCPCS | Performed by: FAMILY MEDICINE

## 2019-11-05 NOTE — PATIENT INSTRUCTIONS
[address] SCREENING SCHEDULE   Tests on this list are recommended by your physician but may not be covered, or covered at this frequency, by your insurer. Please check with your insurance carrier before scheduling to verify coverage.    PREVENTATIV There are no preventive care reminders to display for this patient. Update Health Maintenance if applicable    Flex Sigmoidoscopy Screen  Covered every 5 years No results found for this or any previous visit. No flowsheet data found.      Fecal Occult Blood once after your 65th birthday    Pneumococcal 23 (Pneumovax)  Covered Once after 65 No orders found for this or any previous visit.  Please get once after your 65th birthday    Hepatitis B for Moderate/High Risk       No orders found for this or any previou

## 2019-11-05 NOTE — PROGRESS NOTES
CC: Annual Physical Exam    HPI:   Brooke Garrido is a 80year old female who presents for a complete physical exam.    Pt with increased back pain for last week  Does not take any meds for this     Pt more sob last 2 days no cough.   Mild nasal congestion g/dL    A/G Ratio 0.9 (L) 1.0 - 2.0       Current Outpatient Medications   Medication Sig Dispense Refill   • FUROSEMIDE 20 MG Oral Tab TAKE ONE TABLET BY MOUTH DAILY  90 tablet 3   • POTASSIUM CHLORIDE ER 20 MEQ Oral Tab CR TAKE ONE TABLET BY MOUTH TWICE neck tumor,  80   • Other (cva) Sister          80   • Other (Other) Sister               Social History:   Social History    Socioeconomic History      Marital status:       Spouse name: Not on file      Number of children: Not on file memory loss)      Fall/Risk Assessment          Have you fallen in the last 12 months?: 0-No                                        Fall/Risk Scorin          Depression Screening (PHQ-2/PHQ-9): Over the LAST 2 WEEKS   Little interest or pleasure in doi polydipsia. ALLERGIES:  Denies allergic response, history of asthma, sneezing, hives, eczema or rhinitis.     EXAM:   /84 (BP Location: Left arm, Patient Position: Sitting, Cuff Size: large)   Pulse 112   Temp 97.7 °F (36.5 °C) (Tympanic)   Resp (!) and thought content are normal.    EKG: Possible atrial fibrillation   -Left bundle branch block and left axis. -Inferior infarct -age undetermined.      ABNORMAL     ASSESSMENT AND PLAN:   Baljinder Dunham is a 80year old female who presents for a comple Future    10. Primary osteoarthritis involving multiple joints  Unchanged using walker    11. Stage 3 chronic kidney disease ([facility] [address]  Laboratories do  - CBC WITH DIFFERENTIAL WITH PLATELET; Future  - COMP METABOLIC PANEL (14); Future  - MAGNESIUM;  Future  - External Lab or Procedure   Diabetes Screening      HbgA1C    At Least  Annually for Diabetics No results found for: A1C No flowsheet data found.     Fasting Blood Sugar (FSB)   Patient must be diagnosed with one of the following:   • Hypertension   • Dysli flowsheet data found.      Barium Enema-   uncomfortable but covered  Covered but uncomfortable   Glaucoma Screening      Ophthalmology Visit   Covered annually for Diabetics, people with Glaucoma family history,   Americans over age 48   Tori Hemophiliacs who received Factor VIII or IX concentrates   Clients of institutions for the mentally retarded   Persons who live in the same house as a HepB virus carrier   Homosexual men   Illicit injectable drug abusers     Tetanus Toxoid- Only covered recognition software.  Please excuse any grammatical errors. Call my office if you have any questions regarding this note.

## 2019-11-12 ENCOUNTER — TELEPHONE (OUTPATIENT)
Dept: FAMILY MEDICINE CLINIC | Facility: CLINIC | Age: 84
End: 2019-11-12

## 2019-11-12 NOTE — TELEPHONE ENCOUNTER
Call from [facility]- home health  Pt was released from hospital to home on Sunday. Pt was seen on 11/5 in office- due to A-Fib- pt was directed to the hospital at that time. Yesica Schmitz states that hospitalist ordered home services- skilled nursing and PT.   Pt has a

## 2019-11-12 NOTE — TELEPHONE ENCOUNTER
Started Home health services yesterday and on her discharge papers it listed a medication -   Loratidine  - patient states she doesn't have that medication. Is it ok if she doesn't take this medication?

## 2019-11-18 ENCOUNTER — OFFICE VISIT (OUTPATIENT)
Dept: FAMILY MEDICINE CLINIC | Facility: CLINIC | Age: 84
End: 2019-11-18
Payer: MEDICARE

## 2019-11-18 VITALS
HEART RATE: 84 BPM | OXYGEN SATURATION: 92 % | SYSTOLIC BLOOD PRESSURE: 132 MMHG | RESPIRATION RATE: 20 BRPM | DIASTOLIC BLOOD PRESSURE: 70 MMHG

## 2019-11-18 DIAGNOSIS — I48.91 NEW ONSET A-FIB (HCC): Primary | ICD-10-CM

## 2019-11-18 DIAGNOSIS — I10 ESSENTIAL HYPERTENSION: ICD-10-CM

## 2019-11-18 DIAGNOSIS — I51.89 SYSTOLIC DYSFUNCTION WITHOUT HEART FAILURE: ICD-10-CM

## 2019-11-18 PROCEDURE — 99213 OFFICE O/P EST LOW 20 MIN: CPT | Performed by: FAMILY MEDICINE

## 2019-11-18 PROCEDURE — 1111F DSCHRG MED/CURRENT MED MERGE: CPT | Performed by: FAMILY MEDICINE

## 2019-11-18 RX ORDER — LISINOPRIL 10 MG/1
TABLET ORAL
COMMUNITY
Start: 2019-11-10 | End: 2020-02-10

## 2019-11-18 RX ORDER — CARVEDILOL 3.12 MG/1
3.12 TABLET ORAL 2 TIMES DAILY
COMMUNITY
Start: 2019-11-15 | End: 2020-02-10

## 2019-11-18 NOTE — PATIENT INSTRUCTIONS
Continue current medications  Continue to follow up with cardiology. Heart rate stable today. Return to clinic if any chest pain, shortness of breath, nausea, vomiting, headache, blury vision or dizziness. Continue with physical therapy.    Follow up w

## 2019-11-18 NOTE — PROGRESS NOTES
[address] [place] [address]  PROGRESS NOTE  Chief Complaint:   Patient presents with:  Hospital F/U      HPI:   This is a 80year old female presents to clinic for follow-up from recent hospitalization from 11/5/2019 to 11/9/2019.   Patient was admitted f [facility]  Clarithromycin              Comment:Other reaction(s): weakness, numbness/tinglilng/rm  Doxycycline                 Comment:Other reaction(s): yeast infection  Simvastatin             DIARRHEA    Comment:nausea and diarreha  Current Meds:  Current in stool. MUSCULOSKELETAL:  Denies weakness, muscle aches, back pain, joint pain, swelling or stiffness. NEUROLOGICAL:  Denies headache, dizziness, syncope, numbness or tingling. HEMATOLOGIC:  Denies anemia, bleeding or bruising.   LYMPHATICS:  Denies en vomiting, headache, blury vision or dizziness. Continue with physical therapy. Follow up with Dr Yas Gutierres in 1 month. Health Maintenance: There are no preventive care reminders to display for this patient.     Patient/Caregiver Education: Patient

## 2019-11-27 ENCOUNTER — TELEPHONE (OUTPATIENT)
Dept: FAMILY MEDICINE CLINIC | Facility: CLINIC | Age: 84
End: 2019-11-27

## 2019-11-27 NOTE — TELEPHONE ENCOUNTER
Marine Carver with [facility] is with patient right now. States when she first arrived, patient's O2 was at 86% on 4L. States she has been able to get it to 88%. Other vitals stable: Bp 130/70, T 98.2, P 78, R 22.   Patient is showing no signs of irregular breathing or

## 2019-12-02 ENCOUNTER — TELEPHONE (OUTPATIENT)
Dept: FAMILY MEDICINE CLINIC | Facility: CLINIC | Age: 84
End: 2019-12-02

## 2019-12-02 NOTE — TELEPHONE ENCOUNTER
Looks like patient is usually at 91 or 92%. If she having a cough? How does she feel? I would recommend the emergency room for evaluation since her sats are significantly lower even on the oxygen.

## 2019-12-02 NOTE — TELEPHONE ENCOUNTER
Robb Gonzalez from home care call stating pt O2 sat is 86% on 4 liters of oxygen. Pt has no edema or any other sxs. Pt does not have an appetite and would like to give 1 ensure daily for this pt. OTW for Dr. Lavinia Colon to return.

## 2019-12-02 NOTE — TELEPHONE ENCOUNTER
Pt has no cough and pt is feeling fine and not short of breathe. Last week her 02 sat of 86%. When pt does deep breathing her 02 sat went to 88%. Baldomero Batista states she has been monitoring her for the past 3 weeks, all 02 sat below 90%.      Baldomero Batista state

## 2019-12-03 ENCOUNTER — TELEPHONE (OUTPATIENT)
Dept: FAMILY MEDICINE CLINIC | Facility: CLINIC | Age: 84
End: 2019-12-03

## 2019-12-03 NOTE — TELEPHONE ENCOUNTER
was admitted to hospital today, [facility], heart pain, also needs refill on a medication, Lisinopril 10mg

## 2019-12-03 NOTE — TELEPHONE ENCOUNTER
Patient needs appointment for evaluation with us or her pulmonologist. ( if here, will most likely need CXR)  No future appointments.

## 2019-12-03 NOTE — TELEPHONE ENCOUNTER
Update:  Call from pt daughter- Jacinto James- consent on file. Jacinto James states that pt went to the hospital via ambulance in the middle of the night due to chest pain.   Jacinto James states pt is currently admitted and was already seen today per Dr. Devin Baxter and YASH

## 2019-12-03 NOTE — TELEPHONE ENCOUNTER
Spoke with pt , states pt was taken to the hospital at 2am. Pt  states \"they are keeping her\".   Will notify MD.

## 2019-12-05 ENCOUNTER — TELEPHONE (OUTPATIENT)
Dept: FAMILY MEDICINE CLINIC | Facility: CLINIC | Age: 84
End: 2019-12-05

## 2019-12-05 NOTE — TELEPHONE ENCOUNTER
Alexus Story states pt's 02 sat is at 88% on 5 liters. Jamie Dietz has ordered a new oxygen tank and should be arrive sometime today. Advised collin to call tomorrow with an update on pt's condition.     Jamie Dietz is advising daughter to get pulse ox to help keep

## 2019-12-06 ENCOUNTER — TELEPHONE (OUTPATIENT)
Dept: FAMILY MEDICINE CLINIC | Facility: CLINIC | Age: 84
End: 2019-12-06

## 2019-12-06 NOTE — TELEPHONE ENCOUNTER
Pt daughter Rodney Nuñez)  called, states that pt will be running out of her Lisinopril 10mg- was started at [facility] in November. Pt was given #30 on 11/9/19. Pt has 4 doses left.     Paras Alexander states that pt will need a refill to [address] if she is to continue

## 2019-12-06 NOTE — TELEPHONE ENCOUNTER
Patient is running out of her Lisinopril 10mg, patients daughter wants to know if patient will continue to take it after pt runs out if sp pt needs a refill to [address] [facility] in [place]. Would like a call back.

## 2019-12-11 ENCOUNTER — TELEPHONE (OUTPATIENT)
Dept: FAMILY MEDICINE CLINIC | Facility: CLINIC | Age: 84
End: 2019-12-11

## 2019-12-11 NOTE — TELEPHONE ENCOUNTER
Pt states she was told to call her doctors with update:    Pt states she was in the hospital and released home last Friday 12/6. Pt was admitted for chest pain. Pt was seen per Dr. Saniya Sparks associate yesterday and will have another appt in 1.5 wks.   Milton sanford

## 2019-12-12 NOTE — TELEPHONE ENCOUNTER
Pt informed. Appt scheduled.     Future Appointments   Date Time Provider Vicente Diane   12/20/2019 10:30 AM Cheyenne Cho MD [facility] [place]

## 2019-12-19 ENCOUNTER — TELEPHONE (OUTPATIENT)
Dept: FAMILY MEDICINE CLINIC | Facility: CLINIC | Age: 84
End: 2019-12-19

## 2019-12-19 NOTE — TELEPHONE ENCOUNTER
has appointment with  tomorrow at 10:30, today 12-19 was her last day for home care physical therapy, continue? Continue home care nurse?  Refill oxybutynin [facility], fell last week and bruised her buttocks

## 2019-12-19 NOTE — TELEPHONE ENCOUNTER
Daughter Tatiana Rain called to let you know before appt tomorrow:    PT has ended the last day was today. Hopefully the RN will be continued with home care. Also pt needs refill for Oxybutynin? Also pt feel last week and bruised bottom?     Daughter yu

## 2019-12-20 ENCOUNTER — APPOINTMENT (OUTPATIENT)
Dept: LAB | Age: 84
End: 2019-12-20
Attending: FAMILY MEDICINE
Payer: MEDICARE

## 2019-12-20 ENCOUNTER — OFFICE VISIT (OUTPATIENT)
Dept: FAMILY MEDICINE CLINIC | Facility: CLINIC | Age: 84
End: 2019-12-20
Payer: MEDICARE

## 2019-12-20 VITALS
TEMPERATURE: 97 F | DIASTOLIC BLOOD PRESSURE: 76 MMHG | SYSTOLIC BLOOD PRESSURE: 122 MMHG | RESPIRATION RATE: 22 BRPM | OXYGEN SATURATION: 99 % | HEART RATE: 66 BPM

## 2019-12-20 DIAGNOSIS — E03.8 HYPOTHYROIDISM DUE TO HASHIMOTO'S THYROIDITIS: ICD-10-CM

## 2019-12-20 DIAGNOSIS — I48.91 NEW ONSET A-FIB (HCC): ICD-10-CM

## 2019-12-20 DIAGNOSIS — I10 ESSENTIAL HYPERTENSION: ICD-10-CM

## 2019-12-20 DIAGNOSIS — N18.30 STAGE 3 CHRONIC KIDNEY DISEASE (HCC): ICD-10-CM

## 2019-12-20 DIAGNOSIS — Z00.00 ENCOUNTER FOR ANNUAL HEALTH EXAMINATION: ICD-10-CM

## 2019-12-20 DIAGNOSIS — E06.3 HYPOTHYROIDISM DUE TO HASHIMOTO'S THYROIDITIS: ICD-10-CM

## 2019-12-20 DIAGNOSIS — J84.10 PULMONARY FIBROSIS (HCC): Primary | ICD-10-CM

## 2019-12-20 DIAGNOSIS — R53.1 WEAKNESS: ICD-10-CM

## 2019-12-20 PROBLEM — I44.7 LEFT BUNDLE BRANCH BLOCK: Status: ACTIVE | Noted: 2019-11-08

## 2019-12-20 PROCEDURE — 85025 COMPLETE CBC W/AUTO DIFF WBC: CPT | Performed by: FAMILY MEDICINE

## 2019-12-20 PROCEDURE — 80053 COMPREHEN METABOLIC PANEL: CPT | Performed by: FAMILY MEDICINE

## 2019-12-20 PROCEDURE — 99214 OFFICE O/P EST MOD 30 MIN: CPT | Performed by: FAMILY MEDICINE

## 2019-12-20 PROCEDURE — 84439 ASSAY OF FREE THYROXINE: CPT | Performed by: FAMILY MEDICINE

## 2019-12-20 PROCEDURE — 83735 ASSAY OF MAGNESIUM: CPT | Performed by: FAMILY MEDICINE

## 2019-12-20 PROCEDURE — 36415 COLL VENOUS BLD VENIPUNCTURE: CPT | Performed by: FAMILY MEDICINE

## 2019-12-20 PROCEDURE — 84443 ASSAY THYROID STIM HORMONE: CPT | Performed by: FAMILY MEDICINE

## 2019-12-20 PROCEDURE — 1111F DSCHRG MED/CURRENT MED MERGE: CPT | Performed by: FAMILY MEDICINE

## 2019-12-20 RX ORDER — OXYBUTYNIN CHLORIDE 5 MG/1
5 TABLET, EXTENDED RELEASE ORAL DAILY
Qty: 90 TABLET | Refills: 1 | Status: SHIPPED | OUTPATIENT
Start: 2019-12-20 | End: 2020-06-15

## 2019-12-20 NOTE — PATIENT INSTRUCTIONS
Encourage home exercises 1-2 x a day    Continue present medications    Continue home oxygen at [facility] early January    Labs today    Encourage increase water intake

## 2019-12-20 NOTE — PROGRESS NOTES
[facility]  PROGRESS NOTE  Chief Complaint:   Patient presents with:  Hospital F/U      HPI:   This is a 80year old female coming in for post hospital check- Dec 3.- 5 days in hospital     Pt f.u cardiology 12/10-- stable with meds  Pt 150.0 - 450.0 10(3)uL    MCV 90.2 80.0 - 100.0 fL    MCH 28.1 26.0 - 34.0 pg    MCHC 31.2 31.0 - 37.0 g/dL    RDW 15.1 (H) 11.0 - 15.0 %    RDW-SD 48.8 (H) 35.1 - 46.3 fL    Neutrophil Absolute Prelim 7.25 1.50 - 7.70 x10 (3) uL    Neutrophil Absolute 7.25 recurrance, neck tumor,  80   • Other (cva) Sister          80   • Other (Other) Sister              Allergies:    Darifenacin             HIVES    Comment:Other reaction(s): hives  Enablex                     Comment:Other reaction(s): hives runny nose or sore throat. INTEGUMENTARY:  Denies rashes, itching, skin lesion, or excessive skin dryness.   CARDIOVASCULAR:  Denies chest pain, chest pressure, chest discomfort, palpitations, edema, dyspnea on exertion or at rest.  RESPIRATORY:  Denies sh is able to move the knee normally as well as flex the hip to sit and stand. She has some mild tenderness over her lumbar coccygeal area as well with a faded bruise. HEART: IR Regular rate and rhythm, no murmurs, rubs or gallops.   LUNGS: Clear to ausculta Back pain     Chronic acquired lymphedema     Permanent atrial fibrillation (HCC)     Systolic dysfunction without heart failure     Left bundle branch block      Patient Instructions   Encourage home exercises 1-2 x a day    Continue present medications

## 2019-12-20 NOTE — TELEPHONE ENCOUNTER
Ok for refill oxybutinin. Reviewed. Appointment pending.   Future Appointments   Date Time Provider Vicente Jeronimoi   12/20/2019 10:30 AM Eriberto Murray MD [facility]

## 2019-12-21 ENCOUNTER — TELEPHONE (OUTPATIENT)
Dept: FAMILY MEDICINE CLINIC | Facility: CLINIC | Age: 84
End: 2019-12-21

## 2019-12-21 DIAGNOSIS — I10 ESSENTIAL HYPERTENSION: Primary | ICD-10-CM

## 2019-12-21 DIAGNOSIS — E03.8 HYPOTHYROIDISM DUE TO HASHIMOTO'S THYROIDITIS: ICD-10-CM

## 2019-12-21 DIAGNOSIS — N18.30 STAGE 3 CHRONIC KIDNEY DISEASE (HCC): ICD-10-CM

## 2019-12-21 DIAGNOSIS — E06.3 HYPOTHYROIDISM DUE TO HASHIMOTO'S THYROIDITIS: ICD-10-CM

## 2019-12-21 PROBLEM — I48.21 PERMANENT ATRIAL FIBRILLATION (HCC): Status: ACTIVE | Noted: 2019-11-05

## 2019-12-21 NOTE — TELEPHONE ENCOUNTER
----- Message from Sam Pittman MD sent at 12/21/2019  9:16 AM CST -----  Patient laboratory results reviewed. Patient with decreased renal function since hospitalization.   Patient encouraged to continue with better hydration and current medications

## 2019-12-23 NOTE — TELEPHONE ENCOUNTER
Pt informed.     Future Appointments   Date Time Provider Vicente Diane   1/4/2020 10:30 Suman Walker MD [facility] [place] [facility]

## 2020-01-01 ENCOUNTER — TELEPHONE (OUTPATIENT)
Dept: FAMILY MEDICINE CLINIC | Facility: CLINIC | Age: 85
End: 2020-01-01

## 2020-01-01 ENCOUNTER — APPOINTMENT (OUTPATIENT)
Dept: LAB | Age: 85
End: 2020-01-01
Attending: FAMILY MEDICINE
Payer: MEDICARE

## 2020-01-01 ENCOUNTER — OFFICE VISIT (OUTPATIENT)
Dept: FAMILY MEDICINE CLINIC | Facility: CLINIC | Age: 85
End: 2020-01-01
Payer: MEDICARE

## 2020-01-01 VITALS
BODY MASS INDEX: 30 KG/M2 | SYSTOLIC BLOOD PRESSURE: 132 MMHG | TEMPERATURE: 98 F | HEART RATE: 64 BPM | OXYGEN SATURATION: 92 % | WEIGHT: 142 LBS | RESPIRATION RATE: 16 BRPM | DIASTOLIC BLOOD PRESSURE: 78 MMHG

## 2020-01-01 VITALS
BODY MASS INDEX: 26.81 KG/M2 | WEIGHT: 142 LBS | HEIGHT: 61 IN | RESPIRATION RATE: 18 BRPM | HEART RATE: 65 BPM | DIASTOLIC BLOOD PRESSURE: 80 MMHG | OXYGEN SATURATION: 90 % | TEMPERATURE: 97 F | SYSTOLIC BLOOD PRESSURE: 122 MMHG

## 2020-01-01 DIAGNOSIS — E03.8 HYPOTHYROIDISM DUE TO HASHIMOTO'S THYROIDITIS: ICD-10-CM

## 2020-01-01 DIAGNOSIS — J84.10 PULMONARY FIBROSIS (HCC): ICD-10-CM

## 2020-01-01 DIAGNOSIS — Z51.5 PALLIATIVE CARE STATUS: ICD-10-CM

## 2020-01-01 DIAGNOSIS — I10 ESSENTIAL HYPERTENSION: ICD-10-CM

## 2020-01-01 DIAGNOSIS — L57.0 KERATOSIS: ICD-10-CM

## 2020-01-01 DIAGNOSIS — I10 ESSENTIAL HYPERTENSION: Primary | ICD-10-CM

## 2020-01-01 DIAGNOSIS — J84.10 PULMONARY FIBROSIS (HCC): Primary | ICD-10-CM

## 2020-01-01 DIAGNOSIS — I50.22 CHRONIC SYSTOLIC HEART FAILURE (HCC): Primary | ICD-10-CM

## 2020-01-01 DIAGNOSIS — I48.20 CHRONIC ATRIAL FIBRILLATION (HCC): ICD-10-CM

## 2020-01-01 DIAGNOSIS — R53.83 FATIGUE, UNSPECIFIED TYPE: ICD-10-CM

## 2020-01-01 DIAGNOSIS — E87.1 HYPONATREMIA: ICD-10-CM

## 2020-01-01 DIAGNOSIS — I50.22 CHRONIC SYSTOLIC HEART FAILURE (HCC): ICD-10-CM

## 2020-01-01 DIAGNOSIS — N18.30 STAGE 3 CHRONIC KIDNEY DISEASE (HCC): ICD-10-CM

## 2020-01-01 DIAGNOSIS — Z23 NEED FOR INFLUENZA VACCINATION: ICD-10-CM

## 2020-01-01 DIAGNOSIS — E06.3 HYPOTHYROIDISM DUE TO HASHIMOTO'S THYROIDITIS: ICD-10-CM

## 2020-01-01 DIAGNOSIS — E78.49 FAMILIAL MULTIPLE LIPOPROTEIN-TYPE HYPERLIPIDEMIA: ICD-10-CM

## 2020-01-01 LAB
ALBUMIN SERPL-MCNC: 3.2 G/DL (ref 3.4–5)
ALBUMIN/GLOB SERPL: 0.9 {RATIO} (ref 1–2)
ALP LIVER SERPL-CCNC: 60 U/L (ref 55–142)
ALT SERPL-CCNC: 19 U/L (ref 13–56)
ANION GAP SERPL CALC-SCNC: 1 MMOL/L (ref 0–18)
ANION GAP SERPL CALC-SCNC: 6 MMOL/L (ref 0–18)
AST SERPL-CCNC: 15 U/L (ref 15–37)
BILIRUB SERPL-MCNC: 0.6 MG/DL (ref 0.1–2)
BUN BLD-MCNC: 23 MG/DL (ref 7–18)
BUN BLD-MCNC: 25 MG/DL (ref 7–18)
BUN/CREAT SERPL: 22.5 (ref 10–20)
BUN/CREAT SERPL: 23.6 (ref 10–20)
CALCIUM BLD-MCNC: 9.3 MG/DL (ref 8.5–10.1)
CALCIUM BLD-MCNC: 9.7 MG/DL (ref 8.5–10.1)
CHLORIDE SERPL-SCNC: 97 MMOL/L (ref 98–112)
CHLORIDE SERPL-SCNC: 97 MMOL/L (ref 98–112)
CO2 SERPL-SCNC: 31 MMOL/L (ref 21–32)
CO2 SERPL-SCNC: 32 MMOL/L (ref 21–32)
CREAT BLD-MCNC: 1.02 MG/DL (ref 0.55–1.02)
CREAT BLD-MCNC: 1.06 MG/DL (ref 0.55–1.02)
GLOBULIN PLAS-MCNC: 3.6 G/DL (ref 2.8–4.4)
GLUCOSE BLD-MCNC: 121 MG/DL (ref 70–99)
GLUCOSE BLD-MCNC: 97 MG/DL (ref 70–99)
M PROTEIN MFR SERPL ELPH: 6.8 G/DL (ref 6.4–8.2)
OSMOLALITY SERPL CALC.SUM OF ELEC: 275 MOSM/KG (ref 275–295)
OSMOLALITY SERPL CALC.SUM OF ELEC: 282 MOSM/KG (ref 275–295)
PATIENT FASTING Y/N/NP: NO
POTASSIUM SERPL-SCNC: 3.9 MMOL/L (ref 3.5–5.1)
POTASSIUM SERPL-SCNC: 4.4 MMOL/L (ref 3.5–5.1)
SODIUM SERPL-SCNC: 130 MMOL/L (ref 136–145)
SODIUM SERPL-SCNC: 134 MMOL/L (ref 136–145)

## 2020-01-01 PROCEDURE — 82607 VITAMIN B-12: CPT | Performed by: FAMILY MEDICINE

## 2020-01-01 PROCEDURE — 36415 COLL VENOUS BLD VENIPUNCTURE: CPT | Performed by: FAMILY MEDICINE

## 2020-01-01 PROCEDURE — 80048 BASIC METABOLIC PNL TOTAL CA: CPT

## 2020-01-01 PROCEDURE — 99214 OFFICE O/P EST MOD 30 MIN: CPT | Performed by: FAMILY MEDICINE

## 2020-01-01 PROCEDURE — 84443 ASSAY THYROID STIM HORMONE: CPT | Performed by: FAMILY MEDICINE

## 2020-01-01 PROCEDURE — 99215 OFFICE O/P EST HI 40 MIN: CPT | Performed by: FAMILY MEDICINE

## 2020-01-01 PROCEDURE — 85025 COMPLETE CBC W/AUTO DIFF WBC: CPT | Performed by: FAMILY MEDICINE

## 2020-01-01 PROCEDURE — 80053 COMPREHEN METABOLIC PANEL: CPT | Performed by: FAMILY MEDICINE

## 2020-01-01 PROCEDURE — 90662 IIV NO PRSV INCREASED AG IM: CPT | Performed by: FAMILY MEDICINE

## 2020-01-01 PROCEDURE — G0008 ADMIN INFLUENZA VIRUS VAC: HCPCS | Performed by: FAMILY MEDICINE

## 2020-01-01 PROCEDURE — 84439 ASSAY OF FREE THYROXINE: CPT | Performed by: FAMILY MEDICINE

## 2020-01-01 PROCEDURE — 36415 COLL VENOUS BLD VENIPUNCTURE: CPT

## 2020-01-01 PROCEDURE — 83735 ASSAY OF MAGNESIUM: CPT | Performed by: FAMILY MEDICINE

## 2020-01-01 RX ORDER — LEVOTHYROXINE SODIUM 0.1 MG/1
TABLET ORAL
Qty: 90 TABLET | Refills: 3 | Status: SHIPPED | OUTPATIENT
Start: 2020-01-01

## 2020-01-01 RX ORDER — OXYBUTYNIN CHLORIDE 5 MG/1
TABLET, EXTENDED RELEASE ORAL
Qty: 90 TABLET | Refills: 1 | Status: SHIPPED | OUTPATIENT
Start: 2020-01-01 | End: 2021-01-01

## 2020-01-03 ENCOUNTER — TELEPHONE (OUTPATIENT)
Dept: FAMILY MEDICINE CLINIC | Facility: CLINIC | Age: 85
End: 2020-01-03

## 2020-01-03 NOTE — TELEPHONE ENCOUNTER
Jefferson Loss from [address] called stating that pt is doing very well and will be discharged from home health next week.

## 2020-01-03 NOTE — TELEPHONE ENCOUNTER
Reviewed  Patient has appoint her next week  Future Appointments   Date Time Provider Vicente Diane   1/7/2020  3:30 PM Dana Armstrong MD [facility]

## 2020-01-07 ENCOUNTER — OFFICE VISIT (OUTPATIENT)
Dept: FAMILY MEDICINE CLINIC | Facility: CLINIC | Age: 85
End: 2020-01-07
Payer: MEDICARE

## 2020-01-07 VITALS
HEART RATE: 36 BPM | BODY MASS INDEX: 27 KG/M2 | SYSTOLIC BLOOD PRESSURE: 132 MMHG | WEIGHT: 129 LBS | TEMPERATURE: 97 F | DIASTOLIC BLOOD PRESSURE: 64 MMHG

## 2020-01-07 DIAGNOSIS — Z51.5 PALLIATIVE CARE STATUS: ICD-10-CM

## 2020-01-07 DIAGNOSIS — I48.91 NEW ONSET A-FIB (HCC): ICD-10-CM

## 2020-01-07 DIAGNOSIS — J84.10 PULMONARY FIBROSIS (HCC): Primary | ICD-10-CM

## 2020-01-07 DIAGNOSIS — N18.30 STAGE 3 CHRONIC KIDNEY DISEASE (HCC): ICD-10-CM

## 2020-01-07 DIAGNOSIS — L57.0 KERATOSIS: ICD-10-CM

## 2020-01-07 DIAGNOSIS — I50.23 ACUTE ON CHRONIC SYSTOLIC HEART FAILURE (HCC): ICD-10-CM

## 2020-01-07 PROCEDURE — 99214 OFFICE O/P EST MOD 30 MIN: CPT | Performed by: FAMILY MEDICINE

## 2020-01-07 NOTE — PATIENT INSTRUCTIONS
Continue home health nursing    rec paliative care consult    Home care -- consider Hospice    Recheck with me in 2 weeks

## 2020-01-07 NOTE — PROGRESS NOTES
[facility] [place]  PROGRESS NOTE  Chief Complaint:   Patient presents with:  Medication Follow-Up      HPI:   This is a 80year old female coming in for followup medical care. Pt with hospital stay in December.  Exacerbation of pulmonary fibrosi lb 9.6 oz (71.5 kg)      HPI  Results for orders placed or performed in visit on 40/16/18   COMP METABOLIC PANEL (14)   Result Value Ref Range    Glucose 102 (H) 70 - 99 mg/dL    Sodium 139 136 - 145 mmol/L    Potassium 4.3 3.5 - 5.1 mmol/L    Chloride 105 Encounters:  01/07/20 : 129 lb (58.5 kg)  11/05/19 : 158 lb 6.4 oz (71.8 kg)  08/02/19 : 159 lb 9.6 oz (72.4 kg)  03/20/19 : 156 lb 6.4 oz (70.9 kg)  01/18/19 : 156 lb 4.8 oz (70.9 kg)  01/07/19 : 157 lb 9.6 oz (71.5 kg)    Past Medical History:   [address] tablet (5 mg total) by mouth daily. 90 tablet 1   • lisinopril 10 MG Oral Tab      • carvedilol 3.125 MG Oral Tab Take 3.125 mg by mouth 2 (two) times daily. • apixaban 5 MG Oral Tab Take 5 mg by mouth.      • FUROSEMIDE 20 MG Oral Tab TAKE ONE TABLET ataxia, numbness or tingling in the extremities  HEMATOLOGIC:  Denies anemia, bleeding or bruising. LYMPHATICS:  Denies enlarged nodes  PSYCHIATRIC:  Denies depression or anxiety.   ENDOCRINOLOGIC:  Denies excessive sweating, cold or heat intolerance, poly onset a-fib (HCC)      4. Stage 3 chronic kidney disease (HCC)      5. Keratosis      6. Palliative care status    - HOME HEALTH (EXT)  - SPECIALTY (OTHER) - EXTERNAL    Cryotherapy applied to keratotic lesion.   Patient tolerated well routine local wound c as a result of today.

## 2020-01-08 ENCOUNTER — TELEPHONE (OUTPATIENT)
Dept: FAMILY MEDICINE CLINIC | Facility: CLINIC | Age: 85
End: 2020-01-08

## 2020-01-08 NOTE — TELEPHONE ENCOUNTER
Called and spoke with pt's daughter Meron Truong- consent on file. Meron Truong gave permission to send office notes from 1/7/20 to [facility]. Faxed to #[phone].

## 2020-01-08 NOTE — TELEPHONE ENCOUNTER
Baldomero Batista from [address] states- in order to recertify pt,   Baldomero Batista states pt needs to have had a change in status from previous. Can we fax office notes from 1/7/20 to Baldomero Batista at [facility]?     Baldomero Batista informed pt is declining, informed of pt significant weight loss a

## 2020-01-08 NOTE — TELEPHONE ENCOUNTER
Gerardolex Gray states that before she recertifies pt for home services, she needs to make sure that she will have at least 5 visits for medicare- otherwise Toribio Gray states services are not billable to medicare.     Gerardolex Gray stated that she had Daisy's NP contact, states

## 2020-01-27 ENCOUNTER — OFFICE VISIT (OUTPATIENT)
Dept: FAMILY MEDICINE CLINIC | Facility: CLINIC | Age: 85
End: 2020-01-27
Payer: MEDICARE

## 2020-01-27 VITALS
TEMPERATURE: 97 F | DIASTOLIC BLOOD PRESSURE: 54 MMHG | RESPIRATION RATE: 44 BRPM | HEART RATE: 40 BPM | OXYGEN SATURATION: 89 % | SYSTOLIC BLOOD PRESSURE: 116 MMHG | WEIGHT: 115 LBS | BODY MASS INDEX: 24 KG/M2

## 2020-01-27 DIAGNOSIS — I48.20 CHRONIC ATRIAL FIBRILLATION (HCC): ICD-10-CM

## 2020-01-27 DIAGNOSIS — R04.0 BLEEDING FROM THE NOSE: ICD-10-CM

## 2020-01-27 DIAGNOSIS — I10 ESSENTIAL HYPERTENSION: Primary | ICD-10-CM

## 2020-01-27 DIAGNOSIS — Z51.5 PALLIATIVE CARE STATUS: ICD-10-CM

## 2020-01-27 DIAGNOSIS — G45.9 TRANSIENT CEREBRAL ISCHEMIA, UNSPECIFIED TYPE: ICD-10-CM

## 2020-01-27 PROBLEM — I48.91 ATRIAL FIBRILLATION (HCC): Status: ACTIVE | Noted: 2019-11-05

## 2020-01-27 PROCEDURE — 99213 OFFICE O/P EST LOW 20 MIN: CPT | Performed by: FAMILY MEDICINE

## 2020-01-27 RX ORDER — LEVOTHYROXINE SODIUM 0.1 MG/1
100 TABLET ORAL
Qty: 90 TABLET | Refills: 1 | Status: SHIPPED | OUTPATIENT
Start: 2020-01-27 | End: 2020-01-01

## 2020-01-27 NOTE — PATIENT INSTRUCTIONS
Comments as above. Patient encouraged to continue with a protein shake every day allowed to eat ice cream as much as she wants. Patient to continue same medications with exception of discontinuing aspirin.   Patient update information forwarded to Dr. Brandon Beck

## 2020-01-27 NOTE — PROGRESS NOTES
Nettie Galarza is a 80year old female. Patient presents with: Follow - Up: medical f/u-  last seen 1/7/20. HPI:   Patient presents for recheck of her general status. Patient very sedentary. Does walk short distances with a walker in the home.   [facility] Medications   Medication Sig Dispense Refill   • Oxybutynin Chloride ER 5 MG Oral Tablet 24 Hr Take 1 tablet (5 mg total) by mouth daily.  90 tablet 1   • lisinopril 10 MG Oral Tab      • carvedilol 3.125 MG Oral Tab Take 3.125 mg by mouth 2 (two - 18 mmol/L    BUN 46 (H) 7 - 18 mg/dL    Creatinine 1.72 (H) 0.55 - 1.02 mg/dL    BUN/CREA Ratio 26.7 (H) 10.0 - 20.0    Calcium, Total 9.8 8.5 - 10.1 mg/dL    Calculated Osmolality 300 (H) 275 - 295 mOsm/kg    GFR, Non- 26 (L) >=60    GFR Cuff Size: adult)   Pulse (!) 40   Temp 97.2 °F (36.2 °C) (Tympanic)   Resp (!) 44   SpO2 (!) 89%   GENERAL: well developed, well nourished,in no apparent distress--small amount of bleeding from the nares stopped after pressure held.   Patient in wheelchair

## 2020-02-10 ENCOUNTER — OFFICE VISIT (OUTPATIENT)
Dept: FAMILY MEDICINE CLINIC | Facility: CLINIC | Age: 85
End: 2020-02-10
Payer: MEDICARE

## 2020-02-10 VITALS
HEART RATE: 76 BPM | DIASTOLIC BLOOD PRESSURE: 68 MMHG | OXYGEN SATURATION: 91 % | WEIGHT: 126 LBS | TEMPERATURE: 96 F | BODY MASS INDEX: 27 KG/M2 | RESPIRATION RATE: 40 BRPM | SYSTOLIC BLOOD PRESSURE: 128 MMHG

## 2020-02-10 DIAGNOSIS — Z51.5 PALLIATIVE CARE STATUS: ICD-10-CM

## 2020-02-10 DIAGNOSIS — L57.0 ACTINIC KERATOSIS: ICD-10-CM

## 2020-02-10 DIAGNOSIS — J84.10 PULMONARY FIBROSIS (HCC): ICD-10-CM

## 2020-02-10 DIAGNOSIS — L57.0 KERATOSIS: ICD-10-CM

## 2020-02-10 DIAGNOSIS — N18.30 STAGE 3 CHRONIC KIDNEY DISEASE (HCC): ICD-10-CM

## 2020-02-10 DIAGNOSIS — I10 ESSENTIAL HYPERTENSION: Primary | ICD-10-CM

## 2020-02-10 PROCEDURE — 17110 DESTRUCTION B9 LES UP TO 14: CPT | Performed by: FAMILY MEDICINE

## 2020-02-10 PROCEDURE — 99214 OFFICE O/P EST MOD 30 MIN: CPT | Performed by: FAMILY MEDICINE

## 2020-02-10 RX ORDER — LISINOPRIL 10 MG/1
10 TABLET ORAL DAILY
Qty: 90 TABLET | Refills: 3 | Status: SHIPPED | OUTPATIENT
Start: 2020-02-10

## 2020-02-10 RX ORDER — CARVEDILOL 3.12 MG/1
3.12 TABLET ORAL 2 TIMES DAILY
Qty: 180 TABLET | Refills: 3 | Status: SHIPPED | OUTPATIENT
Start: 2020-02-10 | End: 2020-01-01

## 2020-02-10 NOTE — PATIENT INSTRUCTIONS
Local wound care to facial wounds     continue oxygen    Continue present medications    Continue healthy diet and suppliments/ icecream ok

## 2020-02-10 NOTE — PROGRESS NOTES
Jose Ware is a 80year old female. Patient presents with:  Medication Follow-Up      HPI:   Patient presents for recheck of her general status. Pt has been taking medications as instructed, no medication side effects. Patient doing better.   She ha (5 mg total) by mouth 2 (two) times daily. 180 tablet 3   • Levothyroxine Sodium 100 MCG Oral Tab Take 1 tablet (100 mcg total) by mouth once daily. 90 tablet 1   • Oxybutynin Chloride ER 5 MG Oral Tablet 24 Hr Take 1 tablet (5 mg total) by mouth daily.  [address]. 0    FASTING No    MAGNESIUM   Result Value Ref Range    Magnesium 2.2 1.6 - 2.6 mg/dL   TSH+FREE T4   Result Value Ref Range    Free T4 2.0 (H) 0.8 - 1.7 ng/dL    TSH 3.330 0.358 - 3.740 mIU/mL   CBC W/ DIFFERENTIAL   Result Value Ref Range    WBC 10.5 cm red dry scaly lesion on left forehead. There are 3 keratotic corns on her face 1 in the left forehead one in her mid chin and one on her right cheek in the path where her oxygen tubing lies.   Each area was cleaned and had a K-Y jelly applied and then c

## 2020-03-10 ENCOUNTER — TELEPHONE (OUTPATIENT)
Dept: FAMILY MEDICINE CLINIC | Facility: CLINIC | Age: 85
End: 2020-03-10

## 2020-03-10 ENCOUNTER — APPOINTMENT (OUTPATIENT)
Dept: LAB | Age: 85
End: 2020-03-10
Attending: FAMILY MEDICINE
Payer: MEDICARE

## 2020-03-10 ENCOUNTER — OFFICE VISIT (OUTPATIENT)
Dept: FAMILY MEDICINE CLINIC | Facility: CLINIC | Age: 85
End: 2020-03-10
Payer: MEDICARE

## 2020-03-10 VITALS
SYSTOLIC BLOOD PRESSURE: 122 MMHG | HEART RATE: 56 BPM | OXYGEN SATURATION: 89 % | TEMPERATURE: 97 F | DIASTOLIC BLOOD PRESSURE: 68 MMHG | RESPIRATION RATE: 16 BRPM | BODY MASS INDEX: 28 KG/M2 | WEIGHT: 133 LBS

## 2020-03-10 DIAGNOSIS — L57.0 KERATOSIS: ICD-10-CM

## 2020-03-10 DIAGNOSIS — I50.23 ACUTE ON CHRONIC SYSTOLIC HEART FAILURE (HCC): ICD-10-CM

## 2020-03-10 DIAGNOSIS — N18.30 STAGE 3 CHRONIC KIDNEY DISEASE (HCC): ICD-10-CM

## 2020-03-10 DIAGNOSIS — I51.89 SYSTOLIC DYSFUNCTION WITHOUT HEART FAILURE: ICD-10-CM

## 2020-03-10 DIAGNOSIS — I10 ESSENTIAL HYPERTENSION: Primary | ICD-10-CM

## 2020-03-10 DIAGNOSIS — E06.3 HYPOTHYROIDISM DUE TO HASHIMOTO'S THYROIDITIS: ICD-10-CM

## 2020-03-10 DIAGNOSIS — G45.9 TRANSIENT CEREBRAL ISCHEMIA, UNSPECIFIED TYPE: ICD-10-CM

## 2020-03-10 DIAGNOSIS — L60.2 THICKENED NAIL: ICD-10-CM

## 2020-03-10 DIAGNOSIS — J84.10 PULMONARY FIBROSIS (HCC): ICD-10-CM

## 2020-03-10 DIAGNOSIS — E03.8 HYPOTHYROIDISM DUE TO HASHIMOTO'S THYROIDITIS: ICD-10-CM

## 2020-03-10 LAB
ALBUMIN SERPL-MCNC: 2.7 G/DL (ref 3.4–5)
ALBUMIN/GLOB SERPL: 0.7 {RATIO} (ref 1–2)
ALP LIVER SERPL-CCNC: 68 U/L (ref 55–142)
ALT SERPL-CCNC: 14 U/L (ref 13–56)
ANION GAP SERPL CALC-SCNC: 5 MMOL/L (ref 0–18)
AST SERPL-CCNC: 20 U/L (ref 15–37)
BILIRUB SERPL-MCNC: 0.5 MG/DL (ref 0.1–2)
BUN BLD-MCNC: 15 MG/DL (ref 7–18)
BUN/CREAT SERPL: 14 (ref 10–20)
CALCIUM BLD-MCNC: 9 MG/DL (ref 8.5–10.1)
CHLORIDE SERPL-SCNC: 101 MMOL/L (ref 98–112)
CO2 SERPL-SCNC: 29 MMOL/L (ref 21–32)
CREAT BLD-MCNC: 1.07 MG/DL (ref 0.55–1.02)
GLOBULIN PLAS-MCNC: 4 G/DL (ref 2.8–4.4)
GLUCOSE BLD-MCNC: 99 MG/DL (ref 70–99)
M PROTEIN MFR SERPL ELPH: 6.7 G/DL (ref 6.4–8.2)
OSMOLALITY SERPL CALC.SUM OF ELEC: 281 MOSM/KG (ref 275–295)
PATIENT FASTING Y/N/NP: NO
POTASSIUM SERPL-SCNC: 4.6 MMOL/L (ref 3.5–5.1)
SODIUM SERPL-SCNC: 135 MMOL/L (ref 136–145)
TSI SER-ACNC: 2.54 MIU/ML (ref 0.36–3.74)

## 2020-03-10 PROCEDURE — 80053 COMPREHEN METABOLIC PANEL: CPT | Performed by: FAMILY MEDICINE

## 2020-03-10 PROCEDURE — 36415 COLL VENOUS BLD VENIPUNCTURE: CPT | Performed by: FAMILY MEDICINE

## 2020-03-10 PROCEDURE — 99214 OFFICE O/P EST MOD 30 MIN: CPT | Performed by: FAMILY MEDICINE

## 2020-03-10 PROCEDURE — 84443 ASSAY THYROID STIM HORMONE: CPT | Performed by: FAMILY MEDICINE

## 2020-03-10 NOTE — TELEPHONE ENCOUNTER
----- Message from Nirmal Sam MD sent at 3/10/2020  2:24 PM CDT -----  Laboratory results reviewed. Patient's sodium level 135 stable for her.   Patient's renal function markedly improved her creatinine is down to 1.07 her estimated filtration rate

## 2020-03-10 NOTE — PROGRESS NOTES
[address]  PROGRESS NOTE  Chief Complaint:   Patient presents with: Follow - Up: follow up one month       HPI:   This is a 80year old female coming in for general medical follow-up with spend month or so since she was last seen.   Nely Rdz mg/dL   TSH+FREE T4   Result Value Ref Range    Free T4 2.0 (H) 0.8 - 1.7 ng/dL    TSH 3.330 0.358 - 3.740 mIU/mL   CBC W/ DIFFERENTIAL   Result Value Ref Range    WBC 10.5 4.0 - 11.0 x10(3) uL    RBC 5.01 3.80 - 5.30 x10(6)uL    HGB 14.1 12.0 - 16.0 g/dL tobacco: Never Used    Substance and Sexual Activity      Alcohol use:  Yes        Alcohol/week: 0.0 standard drinks        Frequency: Monthly or less        Comment: wine/beer-    1/2 serving per wk      Drug use: No    Other Topics      Concerns:    Famil Answered       REVIEW OF SYSTEMS:   CONSTITUTIONAL:  Denies unusual weight gain/loss, fever, chills, or fatigue. EENT:  Eyes:  Denies eye pain, visual loss, blurred vision, double vision or yellow sclerae.  Ears, Nose, Throat:  Denies hearing loss, sneezin ulcerations, good dentition. NECK: Supple, no thyromegaly. SKIN: No rashes, no skin lesion, no bruising, good turgor. --Multiple keratotic lesions on her face most are small and flat. There is a keratotic type horn on her right cheek.   Patient agreed to also recommended nail care evaluation.  - PODIATRY - EXTERNAL    8. Hypothyroidism due to Hashimoto's thyroiditis  Unchanged follow-up laboratories  - COMP METABOLIC PANEL (14)  - TSH W REFLEX TO FREE T4    9.  Keratosis  Lesion on right cheek removed patie

## 2020-03-10 NOTE — PATIENT INSTRUCTIONS
Antibiotic ointment to facial wound 1-2 x a day    Labs today     continue meds and oxygen at home    Recheck 2 months- sooner if concerns

## 2020-04-21 ENCOUNTER — TELEPHONE (OUTPATIENT)
Dept: FAMILY MEDICINE CLINIC | Facility: CLINIC | Age: 85
End: 2020-04-21

## 2020-04-21 NOTE — TELEPHONE ENCOUNTER
Updated information from pallative care visit today reviewed. Pt and family are to monitor closely -- I agree with Daisy's recommendation.

## 2020-04-21 NOTE — TELEPHONE ENCOUNTER
Daisy from Palliative Care called. States she was called today by pt's daughterRoy Rubinstein. Daisy states she was told that pt fell at home this AM.  Daisy went to visit pt today between 11:30-12:30.   Daisy states pt told her she thought she fell yesterda

## 2020-05-05 ENCOUNTER — TELEPHONE (OUTPATIENT)
Dept: FAMILY MEDICINE CLINIC | Facility: CLINIC | Age: 85
End: 2020-05-05

## 2020-05-05 NOTE — TELEPHONE ENCOUNTER
Phone f.u would be great. See if they can check BP and weight. Alternative is to reschedule til July if t and family feel that is better for her.

## 2020-05-05 NOTE — TELEPHONE ENCOUNTER
upcoming appt on 5/12    does Dr Richie Villanueva really need to see her in-office ?      please advise

## 2020-05-05 NOTE — TELEPHONE ENCOUNTER
Spoke with pt's daughter John Austin-      Visit updated to phone visit. Asked screening questions:  John Austin asked pt- and reports that pt cough, shortness of breath, and joint pain is her typical, not new and not any different.     Also Johnkhadijah Austin states that pt had

## 2020-05-12 ENCOUNTER — VIRTUAL PHONE E/M (OUTPATIENT)
Dept: FAMILY MEDICINE CLINIC | Facility: CLINIC | Age: 85
End: 2020-05-12
Payer: MEDICARE

## 2020-05-12 VITALS
OXYGEN SATURATION: 98 % | SYSTOLIC BLOOD PRESSURE: 139 MMHG | BODY MASS INDEX: 28 KG/M2 | WEIGHT: 133 LBS | HEART RATE: 56 BPM | DIASTOLIC BLOOD PRESSURE: 62 MMHG

## 2020-05-12 DIAGNOSIS — I50.22 CHRONIC SYSTOLIC HEART FAILURE (HCC): ICD-10-CM

## 2020-05-12 DIAGNOSIS — Z51.5 PALLIATIVE CARE STATUS: ICD-10-CM

## 2020-05-12 DIAGNOSIS — J84.10 PULMONARY FIBROSIS (HCC): ICD-10-CM

## 2020-05-12 DIAGNOSIS — M15.9 PRIMARY OSTEOARTHRITIS INVOLVING MULTIPLE JOINTS: ICD-10-CM

## 2020-05-12 DIAGNOSIS — I10 ESSENTIAL HYPERTENSION: ICD-10-CM

## 2020-05-12 DIAGNOSIS — R26.9 ABNORMALITY OF GAIT: ICD-10-CM

## 2020-05-12 DIAGNOSIS — I48.20 CHRONIC ATRIAL FIBRILLATION (HCC): Primary | ICD-10-CM

## 2020-05-12 PROBLEM — R04.0 BLEEDING FROM THE NOSE: Status: RESOLVED | Noted: 2020-01-27 | Resolved: 2020-05-12

## 2020-05-12 PROCEDURE — 99442 PHONE E/M BY PHYS 11-20 MIN: CPT | Performed by: FAMILY MEDICINE

## 2020-05-12 NOTE — PATIENT INSTRUCTIONS
Patient clinically stable by all reports. Patient to continue present medications. Patient to follow-up for general medical needs and Medicare guidelines in July. We will plan to do laboratories when she is seen at that time.   Patient to continue with i

## 2020-05-12 NOTE — PROGRESS NOTES
Brooke Garrido is a 80year old female. Patient presents with:  Medication Follow-Up    Brooke Garrido  verbally consents to a Virtual/Telephone Check-In service on 5/12/2020.     Patient understands and accepts financial responsibility for any deductible total) by mouth once daily. 90 tablet 1   • Oxybutynin Chloride ER 5 MG Oral Tablet 24 Hr Take 1 tablet (5 mg total) by mouth daily.  90 tablet 1   • FUROSEMIDE 20 MG Oral Tab TAKE ONE TABLET BY MOUTH DAILY  90 tablet 3   • POTASSIUM CHLORIDE ER 20 MEQ Oral - 3.740 mIU/mL     REVIEW OF SYSTEMS:   GENERAL HEALTH: feels well otherwise see HPI  SKIN: denies any unusual skin lesions or rashes  RESPIRATORY: denies cough or shortness of breath  CARDIOVASCULAR: denies chest pain  GI: denies abdominal pain and denies health crisis/national emergency and because of restrictions of visitation. There are limitations of this visit. Every conscious effort was taken to allow for sufficient and adequate time.   This time was  also spent on reviewing labs, medications, radiol

## 2020-06-15 RX ORDER — OXYBUTYNIN CHLORIDE 5 MG/1
TABLET, EXTENDED RELEASE ORAL
Qty: 90 TABLET | Refills: 0 | Status: SHIPPED | OUTPATIENT
Start: 2020-06-15 | End: 2020-01-01

## 2020-06-15 NOTE — TELEPHONE ENCOUNTER
Future appt:     Your appointments     Date & Time Appointment Department [facility])    Jul 15, 2020  1:30 PM CDT Established Patient Office Visit with Santos Larios MD [facility], [facility] ([facility])

## 2020-07-13 NOTE — TELEPHONE ENCOUNTER
Pt should come with daughter- her  should stay home      Future Appointments   Date Time Provider Vicente Diane   7/15/2020  1:30 PM Merritt Bolden MD [facility] [place] [facility]

## 2020-07-13 NOTE — TELEPHONE ENCOUNTER
has shortness of breath, patient has copd  Future Appointments   Date Time Provider Vicente Diane   7/15/2020  1:30 PM Genet Palma MD [facility] [place] [facility]

## 2020-07-13 NOTE — TELEPHONE ENCOUNTER
Pt states she was contacted today and asked covid screening questions for upcoming appt on Wed. Pt has COPD. Pt states that her shortness of breath and occasional dry cough is the same and has not changed.   Pt denies any respiratory illness at this barbara

## 2020-07-15 NOTE — PATIENT INSTRUCTIONS
rec labs today  - chemistry  F/u anticipated October -pending test results      Monitor diet- meals on wheels    Continue medications    Home thrive- home care being started per family

## 2020-07-15 NOTE — PROGRESS NOTES
[address]  PROGRESS NOTE  Chief Complaint:   Patient presents with:   Follow - Up: f/u after labs      HPI:   This is a 80year old female coming in for medical f.u    Weight uncreased  Some increased weight, feels is ating better takes lb (52.2 kg)  01/07/20 : 129 lb (58.5 kg)    Past Medical History:   Diagnosis Date   • Acute on chronic systolic heart failure ([facility] 1/7/2020   • Allergic rhinitis    • Atrial fibrillation ([facility] 11/5/2019   • Chronic acquired lymphedema 1/18/2019   •  24 Hr Take 1 tablet by mouth daily. 90 tablet 0   • carvedilol 3.125 MG Oral Tab Take 1 tablet (3.125 mg total) by mouth 2 (two) times daily. 180 tablet 3   • lisinopril 10 MG Oral Tab Take 1 tablet (10 mg total) by mouth daily.  90 tablet 3   • apixaban 5 polydipsia. ALLERGIES:  Denies allergic response, history of asthma, sneezing, hives, eczema or rhinitis.      EXAM:   /78 (BP Location: Left arm, Patient Position: Sitting, Cuff Size: adult)   Pulse 64   Temp 97.5 °F (36.4 °C) (Other)   Resp 16   Wt Hyponatremia  As above  - BASIC METABOLIC PANEL (8); Future    3. Chronic atrial fibrillation (HCC)  Blood pressure and heart rate stable    4. Chronic systolic heart failure (HCC)  Unchanged stable with oxygen therapy    5.  Pulmonary fibrosis ([facility]  Louie Ventura

## 2020-07-16 NOTE — TELEPHONE ENCOUNTER
----- Message from Antoinette Alves MD sent at 7/15/2020  9:23 PM CDT -----  Patient sodium and chloride are extremely low. Patient's renal function is stable. Patient to be encouraged increased salt in her diet continue her current level of hydration.

## 2020-07-27 NOTE — TELEPHONE ENCOUNTER
Please advise refill of Levothyroxine 100mcg. Last Rx: 1/27/20    Future appt: Your appointments     Date & Time Appointment Department [facility])    Jul 29, 2020  9:45 AM CDT Laboratory Visit with [facility] ([facility])            [facility]  [facility] [place]  [address] [id number]  [phone]        Last Appointment with provider:   7/15/2020 - per notes f/u pending lab results    Last appointment at [facility] [place]:  7/15/2020  Cholesterol, Total (mg/dL)   Date Value   12/13/2018 184     HDL Cholesterol (mg/dL)   Date Value   12/13/2018 73 (H)     LDL Cholesterol (mg/dL)   Date Value   12/13/2018 91     Triglycerides (mg/dL)   Date Value   12/13/2018 100     No results found for: EAG, A1C  Lab Results   Component Value Date    T4F 2.0 (H) 12/20/2019    TSH 2.540 03/10/2020       No follow-ups on file.

## 2020-08-07 NOTE — TELEPHONE ENCOUNTER
----- Message from Gerard Irizarry MD sent at 8/7/2020  7:55 AM CDT -----  Chemistry panel shows improved sodium level and stable renal function.

## 2020-08-07 NOTE — TELEPHONE ENCOUNTER
Left detailed message for pt. Contacted pt's daughter Soha (consent on file)- informed. Soha agreed to let pt know as well.

## 2020-08-18 NOTE — TELEPHONE ENCOUNTER
I am not sure what other providers transition care program has while Troy Saas off- They would have to check with them. Pt appointment for medical f.u if concerns of status.

## 2020-08-18 NOTE — TELEPHONE ENCOUNTER
Dr. Tanja Bosworth took her off of [facility]. wanted to let you know . also loosing home care nurse. wants to know what should they do

## 2020-08-18 NOTE — TELEPHONE ENCOUNTER
Call from pt's daughter. Update:    Pt as seen per Daisy NP on 8/13. Pt was having continued low pulse-  Was 55 that day. Daisy called out to Dr. Kathy Yang. Dr. Kathy Yang had given order to discontinue pt Carvedilol.     Pam's continue-  Daisy will n

## 2020-08-31 NOTE — TELEPHONE ENCOUNTER
Patient's daughter Shahla Brunner states patient recently saw her Cardiologist and had some medication changes. Daughter wants Dr. Herb Jaime to be on the same page as the Cardiologist and would like patient to have a f/u appt with Dr. Herb Jaime.   Daughter doesn't have

## 2020-09-09 NOTE — TELEPHONE ENCOUNTER
Re:  needs Dr to say that she needs it - she is going into [facility]    Please fax  Demo sheet &  note from Dr. Tali Espinoza   Fax#  [phone]

## 2020-09-09 NOTE — TELEPHONE ENCOUNTER
Pt's Diaturneria Party states that [facility] is not going to provide services at this time and it was recommended that pt follow with [facility]. Pt will need referral order and last office notes faxed to contact below.    Wi

## 2020-09-21 NOTE — TELEPHONE ENCOUNTER
Future appt:     Your appointments     Date & Time Appointment Department [facility])    Oct 06, 2020 11:00 AM CDT Follow up - Extended with Hema Argueta MD [facility], Adwoa Agustin ([place])            Pineda Crews

## 2020-10-06 NOTE — PROGRESS NOTES
Shahid Hay is a 80year old female. Patient presents with:  Medication Follow-Up: Patient was taken off of carvedilol by cardiologist  Vaccinations: influenza      HPI:   Patient presents for general medical follow-up.     Patient would like to receiv Cholecalciferol (VITAMIN D) 1000 units Oral Tab Take 2,000 Units by mouth daily. • Calcium Carbonate (CALCIUM 600) 1500 (600 Ca) MG Oral Tab Take 1 tablet by mouth daily.           Past Medical History:   Diagnosis Date   • Acute on chronic systolic h (64.4 kg)   SpO2 90%   BMI 26.83 kg/m²     GENERAL: converses easily; in no apparent distress-patient needing to increase her oxygen with increased activity but when at home and in her normal routine she is stable  SKIN: no rashes, keratotic corns x2 left (14); Future  - MAGNESIUM; Future  - VITAMIN B12; Future  - CBC WITH DIFFERENTIAL WITH PLATELET  - COMP METABOLIC PANEL (14)  - MAGNESIUM  - VITAMIN B12    5. Chronic atrial fibrillation (HCC)  Stable and asymptomatic    6.  Hypothyroidism due to [facility]'

## 2020-10-06 NOTE — PATIENT INSTRUCTIONS
rec local antibiotic care to facial skin areas til healed    rec flu vaccine    Labs today    Continue meds    F.u Dr. Pedro Luis Sims in November

## 2020-10-07 NOTE — TELEPHONE ENCOUNTER
----- Message from Lula Wayne MD sent at 10/7/2020  7:49 AM CDT -----  Laboratory results reviewed. Blood test nonfasting glucose 121.   Patient sodium slightly low at 132 within range of where patient has been over the last year encourage salt int

## 2020-12-22 NOTE — TELEPHONE ENCOUNTER
Patient's daughter, Marques Garrido, is asking whether patient should get covid vaccine when it becomes available. Patient lives in own home with . Patient is on continuous O2. Please advise.

## 2020-12-29 NOTE — TELEPHONE ENCOUNTER
Encourage small, freq amounts of clear liquids, monitor for fever or increased heart rate.  Family to call back if further concerns

## 2020-12-29 NOTE — TELEPHONE ENCOUNTER
Pt's daughter, Chaim Evans- consent on file- states that pt had vomited twice this AM.  Pt had c/o of upset stomach. No diarrhea, no fever, no cough, no shortness of breath reported.     Chaim Evans states pt was shakey this morning but isn't anymore and is re

## 2021-01-01 ENCOUNTER — TELEPHONE (OUTPATIENT)
Dept: FAMILY MEDICINE CLINIC | Facility: CLINIC | Age: 86
End: 2021-01-01

## 2021-01-01 ENCOUNTER — PATIENT OUTREACH (OUTPATIENT)
Dept: FAMILY MEDICINE CLINIC | Facility: CLINIC | Age: 86
End: 2021-01-01

## 2021-01-01 DIAGNOSIS — E87.6 HYPOKALEMIA: ICD-10-CM

## 2021-01-01 DIAGNOSIS — E06.3 HYPOTHYROIDISM DUE TO HASHIMOTO'S THYROIDITIS: Primary | ICD-10-CM

## 2021-01-01 DIAGNOSIS — Z23 NEED FOR VACCINATION: ICD-10-CM

## 2021-01-01 DIAGNOSIS — E03.8 HYPOTHYROIDISM DUE TO HASHIMOTO'S THYROIDITIS: Primary | ICD-10-CM

## 2021-01-01 DIAGNOSIS — E87.1 HYPONATREMIA: Primary | ICD-10-CM

## 2021-01-01 RX ORDER — OXYBUTYNIN CHLORIDE 5 MG/1
TABLET, EXTENDED RELEASE ORAL
Qty: 90 TABLET | Refills: 3 | Status: SHIPPED | OUTPATIENT
Start: 2021-01-01

## 2021-01-01 RX ORDER — POTASSIUM CHLORIDE 20 MEQ/1
TABLET, EXTENDED RELEASE ORAL
Qty: 180 TABLET | Refills: 0 | Status: SHIPPED | OUTPATIENT
Start: 2021-01-01 | End: 2021-01-01

## 2021-01-01 RX ORDER — POTASSIUM CHLORIDE 20 MEQ/1
TABLET, EXTENDED RELEASE ORAL
Qty: 180 TABLET | Refills: 0 | Status: SHIPPED | OUTPATIENT
Start: 2021-01-01

## 2021-01-16 NOTE — TELEPHONE ENCOUNTER
Future appt:    Last Appointment with provider:   10/6/2020([facility])-F/U in spring  Last appointment at [facility]:  10/6/2020    POTASSIUM CHLORIDE ER 20 MEQ Oral Tab CR    180tab  3refill        Filled:10/3/19 Summary: TAKE ONE TABLET BY MOUTH TWICE CHRIS

## 2021-02-10 NOTE — TELEPHONE ENCOUNTER
Re:   patient was found unresponsive - Ambulance was called and she refused to go to Hospital  -   patient is weak.    Daughter wanted to inform Dr. Metro Schaumann

## 2021-02-10 NOTE — TELEPHONE ENCOUNTER
Spoke with pt's daughter, Soha- consent on file. Soha states pt was assisted by Janet Pennington today with her shower-  Pt slumped over and became unresponsive. Soha states that pt \"came too\" quickly - ambulance was called.   Soha states that pt ref

## 2021-02-11 NOTE — TELEPHONE ENCOUNTER
Elmer Holliday wants to very Dr. Priya Chin will following patients care in hospice. She also wants to confirm the order/referral is ok. Please c/b.

## 2021-02-11 NOTE — TELEPHONE ENCOUNTER
Shawn Mcneil called back and states that pt is going to be admitted into hospice care tomorrow. She states that she has had some weight loss. She states that yesterday pt took a bath without her O2 and was found unresponsive.  Shawn Mcneil states that when she saw her th

## 2021-02-12 NOTE — TELEPHONE ENCOUNTER
Spoke with Shayla Santillan who works for [employer]. Kirk Johnson states that pt and family agree to proceeding with hospice at this time. Kirk Johnson states that [employer] will be called Ervin Keen in near future, just FYI.

## 2021-02-12 NOTE — TELEPHONE ENCOUNTER
Needs a verbal certification to admitt patient into to hospice, doctor or nurse  No future appointments.

## 2021-03-23 NOTE — TELEPHONE ENCOUNTER
Oxybutinin: 9/21/20    Future appt:    Last Appointment with provider:   Visit date not found    Last appointment at [facility] [place]:  10/6/2020  Dr.Cathy Chandler/ PCP    Cholesterol, Total (mg/dL)   Date Value   12/13/2018 184     HDL Cholesterol (mg/dL)   D

## 2021-04-15 NOTE — TELEPHONE ENCOUNTER
Pt due for px. Indiewallst message sent    Future appt:    Last Appointment with provider:   10/6/20(manuelito)  Last appointment at [facility]:  Visit date not found    POTASSIUM CHLORIDE ER 20 MEQ Oral Tab CR    180tab  0refill      Filled:1/17/21 Summary: [facility]

## 2021-04-27 NOTE — TELEPHONE ENCOUNTER
Chart reviewed. We have patient indicated to be taking potassium twice a day. However if patient taking once a day and stable that will be fine.   It would be helpful to check her thyroid function and a basic metabolic panel to reassess her potassium leve

## 2021-04-27 NOTE — TELEPHONE ENCOUNTER
Pantera Patel from Hospice states she needed to confirm pt Potassium dose  Pantera Patel states that their records indicated that pt was taking Potassium 20meq once daily. RX recently refilled on 4/16/21- pt dose is listed as BID.   Nurse states pt is taking Lasix 20mg

## 2021-04-27 NOTE — TELEPHONE ENCOUNTER
Pauline Roberts states pt has been taking Potassium BID. Nurse states they will draw pt at next visit encounter next week. Labs will be processed with NM.

## 2021-05-07 NOTE — TELEPHONE ENCOUNTER
Reviewed previous encounter and called Munira De La Rosa. Explained to Ana Paula where I was calling from and in what regard to and she said that Stanley Bell is actually not her patient, and that she was covering for the nurse that has her.  Ana Paula is unable to take the

## 2021-05-07 NOTE — TELEPHONE ENCOUNTER
Dr. Shari Frederick out of the office. Please call RN from prior phone counters regarding lab results. Orders placed to check potassium level, potassium stable on current dosing at. Kidney function stable.   Sodium mildly out of range at 133, ensure patient has e

## 2021-05-08 NOTE — TELEPHONE ENCOUNTER
Thu Abbott returned call. Notified of lab results and Tanesha's instructions. She states patient's hospice nurse's Father  suddenly this past week and that is why she has been unavailable.

## 2021-06-25 NOTE — TELEPHONE ENCOUNTER
Spoke with pt's daughter Tatiana Rain. Tatiana Rain states pt is \"maintaining\" her status. Tatiana Rain states vitals were good last noted by nurse. Weakness is noted. Pt is not gaining weight. Tatiana Rain states pt would weigh less than the 142 lb -last noted on chart.   Chri

## 2021-07-01 NOTE — TELEPHONE ENCOUNTER
Reviewed- they may want to consider [facility] or  Other home care providers. Elder care services in [place] may be able to help as well. The hospice social worker should be able to give them resources as well.

## 2021-07-01 NOTE — TELEPHONE ENCOUNTER
Please advise/FYI    I have spoke to the pts daughter Joana Diane, and she has informed me of an \"episode\" that happened yesterday. Pt had a drop in oxygen and pts  called 911.  Pt was in the ER for 7hours and did not want to be admitted so she went h

## 2021-07-02 NOTE — TELEPHONE ENCOUNTER
Spoke with pt's daughter Bland Must informed. Bland Must will relay message to her father as well. Bland Must states hospice will be coming to visit with pt today.

## 2021-07-06 ENCOUNTER — TELEPHONE (OUTPATIENT)
Dept: FAMILY MEDICINE CLINIC | Facility: CLINIC | Age: 86
End: 2021-07-06

## 2021-07-06 NOTE — TELEPHONE ENCOUNTER
Called Soha. Deepest sympathies extended to family. Soha advised to call anytime with any questions.

## 2021-07-07 ENCOUNTER — TELEPHONE (OUTPATIENT)
Dept: FAMILY MEDICINE CLINIC | Facility: CLINIC | Age: 86
End: 2021-07-07

## (undated) NOTE — MR AVS SNAPSHOT
[facility] [address]  [address] [id number]  [phone]               Thank you for choosing us for your health care visit with Mamadou Crocker MD.  We are glad to serve you and happy to provide you with this summary KLOR-CON M20 20 MEQ Tbcr   Generic drug:  Potassium Chloride ER   Take 20 mEq by mouth 2 (two) times daily. LASIX 20 MG Tabs   Generic drug:  furosemide   Take 20 mg by mouth daily.            Levothyroxine Sodium 25 MCG Tabs   Take 25 mcg by darcy

## (undated) NOTE — MR AVS SNAPSHOT
[address]  [place] [address] [id number]  [phone]               Thank you for choosing us for your health care visit with Josue Richardson MD.  We are glad to serve you and happy to provide you with this summary Generic drug:  Potassium Chloride ER   Take 20 mEq by mouth daily. 2 tabs once daily           LASIX 20 MG Tabs   Generic drug:  furosemide   Take 20 mg by mouth daily.            Levothyroxine Sodium 75 MCG Tabs   Take 75 mcg by mouth before breakfast.   C EAT THESE FOODS MORE OFTEN: EAT THESE FOODS LESS OFTEN:   Make half your plate fruits and vegetables Highly refined, white starches including white bread, rice and pasta   Eat plenty of protein, keep the fat content low Sugars:  sodas and sports drinks, ca

## (undated) NOTE — LETTER
12/01/18        [address]  [address] [facility],[address]      Dear Anabela Hall,    [address] records indicate that you have outstanding lab work and or testing that was ordered for you and has not yet been completed:  Orders Placed This Encounter